# Patient Record
Sex: MALE | Race: OTHER | Employment: OTHER | URBAN - METROPOLITAN AREA
[De-identification: names, ages, dates, MRNs, and addresses within clinical notes are randomized per-mention and may not be internally consistent; named-entity substitution may affect disease eponyms.]

---

## 2019-06-11 ENCOUNTER — OFFICE VISIT (OUTPATIENT)
Dept: INTERNAL MEDICINE CLINIC | Age: 59
End: 2019-06-11

## 2019-06-11 VITALS
HEART RATE: 79 BPM | OXYGEN SATURATION: 96 % | RESPIRATION RATE: 16 BRPM | DIASTOLIC BLOOD PRESSURE: 84 MMHG | HEIGHT: 70 IN | WEIGHT: 249 LBS | BODY MASS INDEX: 35.65 KG/M2 | SYSTOLIC BLOOD PRESSURE: 123 MMHG | TEMPERATURE: 98.5 F

## 2019-06-11 DIAGNOSIS — Z13.1 SCREENING FOR DIABETES MELLITUS: ICD-10-CM

## 2019-06-11 DIAGNOSIS — E55.9 VITAMIN D DEFICIENCY: ICD-10-CM

## 2019-06-11 DIAGNOSIS — I10 ESSENTIAL HYPERTENSION: ICD-10-CM

## 2019-06-11 DIAGNOSIS — E78.2 MIXED HYPERLIPIDEMIA: ICD-10-CM

## 2019-06-11 DIAGNOSIS — Z12.5 SCREENING FOR MALIGNANT NEOPLASM OF PROSTATE: ICD-10-CM

## 2019-06-11 DIAGNOSIS — Z11.59 NEED FOR HEPATITIS C SCREENING TEST: ICD-10-CM

## 2019-06-11 DIAGNOSIS — E66.01 SEVERE OBESITY (HCC): ICD-10-CM

## 2019-06-11 DIAGNOSIS — R53.83 FATIGUE, UNSPECIFIED TYPE: ICD-10-CM

## 2019-06-11 DIAGNOSIS — Z00.00 PHYSICAL EXAM: Primary | ICD-10-CM

## 2019-06-11 DIAGNOSIS — Z12.11 SCREENING FOR COLON CANCER: ICD-10-CM

## 2019-06-11 LAB
25(OH)D3 SERPL-MCNC: 30 NG/ML (ref 30–96)
A-G RATIO,AGRAT: 1.7 RATIO
ALBUMIN SERPL-MCNC: 4.8 G/DL (ref 3.9–5.4)
ALP SERPL-CCNC: 83 U/L (ref 38–126)
ALT SERPL-CCNC: 22 U/L (ref 9–52)
ANION GAP SERPL CALC-SCNC: 10 MMOL/L
AST SERPL W P-5'-P-CCNC: 26 U/L (ref 14–36)
BILIRUB SERPL-MCNC: 0.6 MG/DL (ref 0.2–1.3)
BILIRUB UR QL: NEGATIVE
BUN SERPL-MCNC: 15 MG/DL (ref 9–20)
BUN/CREATININE RATIO,BUCR: 19 RATIO
CALCIUM SERPL-MCNC: 10 MG/DL (ref 8.4–10.2)
CHLORIDE SERPL-SCNC: 101 MMOL/L (ref 98–107)
CHOL/HDL RATIO,CHHD: 5 RATIO (ref 0–4)
CHOLEST SERPL-MCNC: 174 MG/DL (ref 0–200)
CLARITY: CLEAR
CO2 SERPL-SCNC: 32 MMOL/L (ref 22–32)
COLOR UR: ABNORMAL
CREAT SERPL-MCNC: 0.8 MG/DL (ref 0.8–1.5)
ERYTHROCYTE [DISTWIDTH] IN BLOOD BY AUTOMATED COUNT: 14.9 %
GLOBULIN,GLOB: 2.9
GLUCOSE 24H UR-MRATE: NEGATIVE G/(24.H)
GLUCOSE SERPL-MCNC: 105 MG/DL (ref 75–110)
HCT VFR BLD AUTO: 46.9 % (ref 37–51)
HDLC SERPL-MCNC: 36 MG/DL (ref 35–130)
HGB BLD-MCNC: 15.8 G/DL (ref 12–18)
HGB UR QL STRIP: NEGATIVE
KETONES UR QL STRIP.AUTO: NEGATIVE
LDL/HDL RATIO,LDHD: 3 RATIO
LDLC SERPL CALC-MCNC: 118 MG/DL (ref 0–130)
LEUKOCYTE ESTERASE: NEGATIVE
LYMPHOCYTES ABSOLUTE: 3 K/UL (ref 0.6–4.1)
LYMPHOCYTES NFR BLD: 32.8 % (ref 10–58.5)
MCH RBC QN AUTO: 29.7 PG (ref 26–32)
MCHC RBC AUTO-ENTMCNC: 33.7 G/DL (ref 30–36)
MCV RBC AUTO: 88.2 FL (ref 80–97)
MONOCYTES ABS-DIF,2141: 0.7 K/UL (ref 0–1.8)
MONOCYTES NFR BLD: 7.1 % (ref 0.1–24)
NEUTROPHILS # BLD: 60.1 % (ref 37–92)
NEUTROPHILS ABS,2156: 5.5 K/UL (ref 2–7.8)
NITRITE UR QL STRIP.AUTO: NEGATIVE
PH UR STRIP: 6 [PH] (ref 5–7)
PLATELET # BLD AUTO: 293 K/UL (ref 140–440)
PMV BLD AUTO: 7.7 FL
POTASSIUM SERPL-SCNC: 3.7 MMOL/L (ref 3.6–5)
PROT SERPL-MCNC: 7.7 G/DL (ref 6.3–8.2)
PROT UR STRIP-MCNC: ABNORMAL MG/DL
PSA, TEST22: 0.6 NG/ML (ref 0–4)
RBC # BLD AUTO: 5.32 M/UL (ref 4.2–6.3)
RBC #/AREA URNS HPF: 0 #/HPF
SODIUM SERPL-SCNC: 143 MMOL/L (ref 137–145)
SP GR UR REFRACTOMETRY: 1.02 (ref 1–1.03)
T4 FREE SERPL-MCNC: 0.82 NG/DL (ref 0.58–2.3)
TRIGL SERPL-MCNC: 101 MG/DL (ref 0–200)
TSH SERPL DL<=0.05 MIU/L-ACNC: 1.68 UIU/ML (ref 0.34–5.6)
UROBILINOGEN UR QL STRIP.AUTO: NEGATIVE
VLDLC SERPL CALC-MCNC: 20 MG/DL
WBC # BLD AUTO: 9.2 K/UL (ref 4.1–10.9)
WBC URNS QL MICRO: 0 #/HPF

## 2019-06-11 RX ORDER — LOSARTAN POTASSIUM AND HYDROCHLOROTHIAZIDE 25; 100 MG/1; MG/1
1 TABLET ORAL DAILY
COMMUNITY
End: 2019-06-11 | Stop reason: SDUPTHER

## 2019-06-11 RX ORDER — AMLODIPINE BESYLATE 10 MG/1
10 TABLET ORAL DAILY
Qty: 90 TAB | Refills: 3 | Status: SHIPPED | OUTPATIENT
Start: 2019-06-11 | End: 2020-08-31 | Stop reason: SDUPTHER

## 2019-06-11 RX ORDER — LOSARTAN POTASSIUM AND HYDROCHLOROTHIAZIDE 25; 100 MG/1; MG/1
1 TABLET ORAL DAILY
Qty: 90 TAB | Refills: 3 | Status: SHIPPED | OUTPATIENT
Start: 2019-06-11 | End: 2020-08-31 | Stop reason: SDUPTHER

## 2019-06-11 NOTE — PROGRESS NOTES
Subjective:  Mr. Shaq Santos is a pleasant 66year old gentleman who is transitioning his care from Dr. Chen Hay, since he no longer takes his insurance. He is here to establish as a new patient. He has no complaints. Past Medical History:   Diagnosis Date    Hypercholesterolemia     Hypertension, benign      Past Surgical History:   Procedure Laterality Date    HX TONSILLECTOMY      HX WISDOM TEETH EXTRACTION         Current Outpatient Medications on File Prior to Visit   Medication Sig Dispense Refill    omega-3 fatty acids-vitamin e (FISH OIL) 1,000 mg Cap Take  by mouth two (2) times a day. No current facility-administered medications on file prior to visit. Allergies   Allergen Reactions    Erythromycin Nausea and Vomiting       Past Medical History/Surgeries:    1. Tonsillectomy. Illnesses:  1. Hypertension for the past 10 years. Family History:  Father  at age 46 of complications of lymphoma. Mother  age 52 of a heart attack. Two siblings are living, both his brother and sister are diabetic. Social History:  He is a . His wife passed away ten weeks ago secondary to complications of COPD. He has two daughters living and well. He does power washing. Allergies:  Erythromycin, which causes nausea. Medications:  1. Amlodipine 10 mg daily. 2. Losartan/Hydrochlorothiazide 100/25 mg daily. 3. Omega 3 fatty acid 1,000 mg twice daily. Habits:  Nonsmoker and non drinker. Review of Systems:  HEENT:  Denies any headaches, dizziness or blurred vision. He does wear reading glasses. CVR:  Denies any chest pain or palpitations. Denies any shortness of breath, cough, wheezing, PND or orthopnea. Denies any ankle edema. GI:  Appetite is good, weight is stable. Does have a normal bowel pattern without presence of blood in stools or melena. He is in need of getting a colonoscopy. :  Denies any urinary symptoms.      Physical Examination:  GENERAL:  Sammie gentleman in no acute distress. He is alert and oriented. He answers my questions appropriately. VITALS:  BP: 123/84. P: 79.  R: 16.  T: 98.5. O2 sat: 96.  WT: 249 lbs. HT: 5'10\". HEENT:  Normocephalic, atraumatic. PERRLA, EOMI. TMs normal.  Mouth mucosa pink. Tongue midline. Pharynx normal.  NECK:  Supple without adenopathy, thyromegaly or carotid bruits. CHEST:  Lungs clear to auscultation, no rales or wheezes. CV:  Heart regular rhythm without murmur or gallop. ABDOMEN:  Soft, non tender, no organomegaly or masses. No lymphadenopathy. EXTREMITIES:  No edema or calf tenderness. Distal pulses were present. NEUROLOGIC:  Cranial nerves II-XII intact. Excellent strength in the upper and lower extremities against resistance. Sensation is preserved. Romberg is negative. Reflexes 1+ and symmetrical.  He had excellent coordination. Impression:  1. Normal PE.  2. Hypertension, stable. Plan:  1. He was fasting this morning so therefore it was opted to get all of his lab studies. I will call him as soon as I have his results. Otherwise I will see him yearly. 2. He declined his Shingrix immunization. 3. He will be scheduled for his colonoscopy at a later date as per his request.  4. We discussed the importance of a prudent diet, exercise and weight management to keep BMI within acceptable level.

## 2019-06-11 NOTE — PATIENT INSTRUCTIONS
High Blood Pressure: Care Instructions Overview It's normal for blood pressure to go up and down throughout the day. But if it stays up, you have high blood pressure. Another name for high blood pressure is hypertension. Despite what a lot of people think, high blood pressure usually doesn't cause headaches or make you feel dizzy or lightheaded. It usually has no symptoms. But it does increase your risk of stroke, heart attack, and other problems. You and your doctor will talk about your risks of these problems based on your blood pressure. Your doctor will give you a goal for your blood pressure. Your goal will be based on your health and your age. Lifestyle changes, such as eating healthy and being active, are always important to help lower blood pressure. You might also take medicine to reach your blood pressure goal. 
Follow-up care is a key part of your treatment and safety. Be sure to make and go to all appointments, and call your doctor if you are having problems. It's also a good idea to know your test results and keep a list of the medicines you take. How can you care for yourself at home? Medical treatment · If you stop taking your medicine, your blood pressure will go back up. You may take one or more types of medicine to lower your blood pressure. Be safe with medicines. Take your medicine exactly as prescribed. Call your doctor if you think you are having a problem with your medicine. · Talk to your doctor before you start taking aspirin every day. Aspirin can help certain people lower their risk of a heart attack or stroke. But taking aspirin isn't right for everyone, because it can cause serious bleeding. · See your doctor regularly. You may need to see the doctor more often at first or until your blood pressure comes down. · If you are taking blood pressure medicine, talk to your doctor before you take decongestants or anti-inflammatory medicine, such as ibuprofen. Some of these medicines can raise blood pressure. · Learn how to check your blood pressure at home. Lifestyle changes · Stay at a healthy weight. This is especially important if you put on weight around the waist. Losing even 10 pounds can help you lower your blood pressure. · If your doctor recommends it, get more exercise. Walking is a good choice. Bit by bit, increase the amount you walk every day. Try for at least 30 minutes on most days of the week. You also may want to swim, bike, or do other activities. · Avoid or limit alcohol. Talk to your doctor about whether you can drink any alcohol. · Try to limit how much sodium you eat to less than 2,300 milligrams (mg) a day. Your doctor may ask you to try to eat less than 1,500 mg a day. · Eat plenty of fruits (such as bananas and oranges), vegetables, legumes, whole grains, and low-fat dairy products. · Lower the amount of saturated fat in your diet. Saturated fat is found in animal products such as milk, cheese, and meat. Limiting these foods may help you lose weight and also lower your risk for heart disease. · Do not smoke. Smoking increases your risk for heart attack and stroke. If you need help quitting, talk to your doctor about stop-smoking programs and medicines. These can increase your chances of quitting for good. When should you call for help? Call 911 anytime you think you may need emergency care. This may mean having symptoms that suggest that your blood pressure is causing a serious heart or blood vessel problem. Your blood pressure may be over 180/120. 
 For example, call 911 if: 
  · You have symptoms of a heart attack. These may include: 
? Chest pain or pressure, or a strange feeling in the chest. 
? Sweating. ? Shortness of breath. ? Nausea or vomiting. ? Pain, pressure, or a strange feeling in the back, neck, jaw, or upper belly or in one or both shoulders or arms. ? Lightheadedness or sudden weakness. ? A fast or irregular heartbeat.  
  · You have symptoms of a stroke. These may include: 
? Sudden numbness, tingling, weakness, or loss of movement in your face, arm, or leg, especially on only one side of your body. ? Sudden vision changes. ? Sudden trouble speaking. ? Sudden confusion or trouble understanding simple statements. ? Sudden problems with walking or balance. ? A sudden, severe headache that is different from past headaches.  
  · You have severe back or belly pain.  
 Do not wait until your blood pressure comes down on its own. Get help right away. 
 Call your doctor now or seek immediate care if: 
  · Your blood pressure is much higher than normal (such as 180/120 or higher), but you don't have symptoms.  
  · You think high blood pressure is causing symptoms, such as: 
? Severe headache. 
? Blurry vision.  
 Watch closely for changes in your health, and be sure to contact your doctor if: 
  · Your blood pressure measures higher than your doctor recommends at least 2 times. That means the top number is higher or the bottom number is higher, or both.  
  · You think you may be having side effects from your blood pressure medicine. Where can you learn more? Go to http://anupam-jim.info/. Enter J288 in the search box to learn more about \"High Blood Pressure: Care Instructions. \" Current as of: July 22, 2018 Content Version: 11.9 © 2371-7699 [employer]. Care instructions adapted under license by [email or web address] (which disclaims liability or warranty for this information). If you have questions about a medical condition or this instruction, always ask your healthcare professional. [place] [address] any warranty or liability for your use of this information.

## 2019-06-11 NOTE — PROGRESS NOTES
Singh Mulligan  Identified pt with two pt identifiers(name and ). Chief Complaint   Patient presents with   [address]   Transferring care from Dr Bernadette Clifford. He is fasting     1. Have you been to the ER, urgent care clinic since your last visit? Hospitalized since your last visit? no    2. Have you seen or consulted any other health care providers outside of the [address] [place] [address] since your last visit? Include any pap smears or colon screening. no      Medication reconciliation up to date and corrected with patient at this time. Today's provider has been notified of reason for visit, vitals and flowsheets obtained on patients. Reviewed record in preparation for visit, huddled with provider and have obtained necessary documentation. Depression Risk Factor Screening:     3 most recent PHQ Screens 2019   Little interest or pleasure in doing things Not at all   Feeling down, depressed, irritable, or hopeless Several days   Total Score PHQ 2 1       Functional Ability and Level of Safety:     Activities of Daily Living  No flowsheet data found. Fall Risk  No flowsheet data found. Abuse Screen  No flowsheet data found.         Health Maintenance Due   Topic    Hepatitis C Screening     DTaP/Tdap/Td series (1 - Tdap)    Shingrix Vaccine Age 50> (1 of 2)    FOBT Q 1 YEAR AGE 50-75        Wt Readings from Last 3 Encounters:   19 249 lb (112.9 kg)   11/17/10 239 lb 12.8 oz (108.8 kg)     Temp Readings from Last 3 Encounters:   19 98.5 °F (36.9 °C) (Oral)     BP Readings from Last 3 Encounters:   19 123/84   11/17/10 160/88     Pulse Readings from Last 3 Encounters:   19 79   11/17/10 71     Vitals:    19 1001   BP: 123/84   Pulse: 79   Resp: 16   Temp: 98.5 °F (36.9 °C)   TempSrc: Oral   SpO2: 96%   Weight: 249 lb (112.9 kg)   Height: 5' 10\" (1.778 m)   PainSc:   0 - No pain         Patient Care Team   Patient Care Team:  Pablo Watkins NP as PCP - General (Nurse Practitioner)

## 2019-06-12 LAB
EST. AVERAGE GLUCOSE BLD GHB EST-MCNC: 117 MG/DL
HBA1C MFR BLD: 5.7 % (ref 4.8–5.6)
HCV AB S/CO SERPL IA: <0.1 S/CO RATIO (ref 0–0.9)

## 2019-07-01 ENCOUNTER — TELEPHONE (OUTPATIENT)
Dept: INTERNAL MEDICINE CLINIC | Age: 59
End: 2019-07-01

## 2019-07-08 ENCOUNTER — OFFICE VISIT (OUTPATIENT)
Dept: INTERNAL MEDICINE CLINIC | Age: 59
End: 2019-07-08

## 2019-07-08 VITALS
BODY MASS INDEX: 33.64 KG/M2 | WEIGHT: 235 LBS | HEART RATE: 97 BPM | RESPIRATION RATE: 16 BRPM | HEIGHT: 70 IN | SYSTOLIC BLOOD PRESSURE: 138 MMHG | DIASTOLIC BLOOD PRESSURE: 84 MMHG | TEMPERATURE: 98.3 F | OXYGEN SATURATION: 96 %

## 2019-07-08 DIAGNOSIS — N52.9 ERECTILE DYSFUNCTION, UNSPECIFIED ERECTILE DYSFUNCTION TYPE: Primary | ICD-10-CM

## 2019-07-08 RX ORDER — BISMUTH SUBSALICYLATE 262 MG
1 TABLET,CHEWABLE ORAL DAILY
COMMUNITY

## 2019-07-08 NOTE — PATIENT INSTRUCTIONS
Erectile Dysfunction: Care Instructions  Your Care Instructions    A man has erectile dysfunction (ED) when he routinely can't get or keep an erection that allows satisfactory sex. He may not be able to have an erection at any time. Or he may not be able to have one that is firm enough or lasts long enough to complete intercourse. ED is not the same as having trouble getting an erection now and then. That's common. It happens to most men at some time. ED can be caused by problems with the blood vessels, nerves, or hormones. It can be caused by diabetes, heart disease, and injuries. Nerve disorders, such as multiple sclerosis or Parkinson's disease, can also cause it. ED can also be caused by medicines, alcohol, and tobacco. Or it may be caused by depression, stress, grief, or relationship problems. Follow-up care is a key part of your treatment and safety. Be sure to make and go to all appointments, and call your doctor if you are having problems. It's also a good idea to know your test results and keep a list of the medicines you take. How can you care for yourself at home?   Lifestyle    · Limit alcohol. Have no more than 2 drinks a day.     · Do not smoke. Smoking makes it harder for the blood vessels in the penis to relax and let blood flow in. If you need help quitting, talk to your doctor about stop-smoking programs and medicines. These can increase your chances of quitting for good.     · Do not use cocaine, heroin, or other illegal drugs.     · Try to reduce stress.     · Give yourself time to adjust to change. Changes in your job, family, relationships, home life, and other areas can cause stress. And stress can cause erection problems.    Work with your partner    · Don't assume that you know what your partner likes when it comes to sex. You may be wrong. Talk about what each of you does and does not enjoy.     · Make time outside of the bedroom to talk about your sex life.  If you avoid sex because you are afraid of having erection problems, your partner may worry that you are no longer interested.     · If you and your partner have trouble talking about sex, see a therapist who can help you talk about it. Reading books with your partner about sexual health may also help.     · Relax. Take time for more foreplay. Worrying about your erections may only make things worse. Medicines    · Tell your doctor about all the medicines that you take. ? Some medicines can cause erection problems. ? Some medicines can have dangerous interactions with medicines that are prescribed for ED, including over-the-counter medicines and herbal products.     · Be safe with medicines. Take your medicines exactly as prescribed. Call your doctor if you think you are having a problem with your medicine.     · Talk to your doctor about trying a medicine to help you keep an erection. This could be a medicine such as Viagra, Levitra, or Cialis. If you have a heart problem, ask your doctor if these are safe for you. Do not take these medicines if you take nitroglycerin or other nitrate medicine. When should you call for help? Call your doctor now or seek immediate medical care if:    · You took a medicine for erectile dysfunction and you have an erection that lasts longer than 3 hours.    Watch closely for changes in your health, and be sure to contact your doctor if you have any problems. Where can you learn more? Go to http://anupam-jim.info/. Enter [id number] [id number] in the search box to learn more about \"Erectile Dysfunction: Care Instructions. \"  Current as of: September 26, 2018  Content Version: 11.9  © 1970-0423 [employer]. Care instructions adapted under license by [employer] (which disclaims liability or warranty for this information).  If you have questions about a medical condition or this instruction, always ask your healthcare professional. [address] any warranty or liability for your use of this information.

## 2019-07-08 NOTE — PROGRESS NOTES
Subjective:  Mr. April Valdez is a pleasant 66year old gentleman who comes in to discuss ED. He would like a referral to urologist.  Further discussion with him revealed that he really has not been sexually active in the last five to six years since his wife was ill. His wife passed away five months ago. He has now met a new partner and tells me that he was unable to perform. This has gotten him somewhat upset. Past Medical History:   Diagnosis Date    Hypercholesterolemia     Hypertension, benign      Past Surgical History:   Procedure Laterality Date    HX TONSILLECTOMY      HX WISDOM TEETH EXTRACTION         Current Outpatient Medications on File Prior to Visit   Medication Sig Dispense Refill    multivitamin (ONE A DAY) tablet Take 1 Tab by mouth daily.  losartan-hydroCHLOROthiazide (HYZAAR) 100-25 mg per tablet Take 1 Tab by mouth daily. 90 Tab 3    amLODIPine (NORVASC) 10 mg tablet Take 1 Tab by mouth daily. 90 Tab 3    omega-3 fatty acids-vitamin e (FISH OIL) 1,000 mg Cap Take  by mouth two (2) times a day. No current facility-administered medications on file prior to visit. Allergies   Allergen Reactions    Erythromycin Nausea and Vomiting   Physical Examination:  GENERAL:  Pleasant gentleman in no acute distress. He is alert and oriented. VITALS:  BP: 143/91 initially, repeated by myself 138/84. P: 97.  R: 16.  T: 98.3. O2 sat: 96.  WT: 235 lbs, which is down by 14 lbs since I saw him in June. He has done this intentionally. HEENT:  Normocephalic, atraumatic. NECK:  Supple without adenopathy. CHEST:  Lungs clear to auscultation, no rales or wheezes. CV:  Heart regular rhythm without murmur. EXTREMITIES:  No edema or calf tenderness. Distal pulses were present. Impression:  1. ED. Plan:  1.  [facility].

## 2019-07-08 NOTE — PROGRESS NOTES
Columba Sewell  Identified pt with two pt identifiers(name and ). Chief Complaint   Patient presents with    Erectile Dysfunction       1. Have you been to the ER, urgent care clinic since your last visit? Hospitalized since your last visit? no    2. Have you seen or consulted any other health care providers outside of the [address] [place] [address] since your last visit? Include any pap smears or colon screening. no      Medication reconciliation up to date and corrected with patient at this time. Today's provider has been notified of reason for visit, vitals and flowsheets obtained on patients. Reviewed record in preparation for visit, huddled with provider and have obtained necessary documentation. Discussed Advanced Care Directives. No information on file. Information given for patient to review. Depression Risk Factor Screening:     3 most recent PHQ Screens 2019   Little interest or pleasure in doing things Not at all   Feeling down, depressed, irritable, or hopeless Several days   Total Score PHQ 2 1       Functional Ability and Level of Safety:     Activities of Daily Living  No flowsheet data found. Fall Risk  No flowsheet data found. Abuse Screen  No flowsheet data found.         Health Maintenance Due   Topic    DTaP/Tdap/Td series (1 - Tdap)    FOBT Q 1 YEAR AGE 50-75        Wt Readings from Last 3 Encounters:   19 235 lb (106.6 kg)   19 249 lb (112.9 kg)   11/17/10 239 lb 12.8 oz (108.8 kg)     Temp Readings from Last 3 Encounters:   19 98.3 °F (36.8 °C) (Oral)   19 98.5 °F (36.9 °C) (Oral)     BP Readings from Last 3 Encounters:   19 (!) 143/91   19 123/84   11/17/10 160/88     Pulse Readings from Last 3 Encounters:   19 97   19 79   11/17/10 71     Vitals:    19 0924   BP: (!) 143/91   Pulse: 97   Resp: 16   Temp: 98.3 °F (36.8 °C)   TempSrc: Oral   SpO2: 96%   Weight: 235 lb (106.6 kg)   Height: 5' 10\" (1.778 m) PainSc:   2         Patient Care Team   Patient Care Team:  Jodee Cho NP as PCP - General (Nurse Practitioner)

## 2019-07-25 ENCOUNTER — APPOINTMENT (OUTPATIENT)
Dept: GENERAL RADIOLOGY | Age: 59
End: 2019-07-25
Attending: EMERGENCY MEDICINE
Payer: MEDICAID

## 2019-07-25 ENCOUNTER — HOSPITAL ENCOUNTER (EMERGENCY)
Age: 59
Discharge: HOME | End: 2019-07-25
Attending: EMERGENCY MEDICINE | Admitting: EMERGENCY MEDICINE
Payer: MEDICAID

## 2019-07-25 VITALS
BODY MASS INDEX: 32.76 KG/M2 | OXYGEN SATURATION: 97 % | HEART RATE: 88 BPM | SYSTOLIC BLOOD PRESSURE: 156 MMHG | HEIGHT: 70 IN | WEIGHT: 228.84 LBS | RESPIRATION RATE: 18 BRPM | DIASTOLIC BLOOD PRESSURE: 85 MMHG | TEMPERATURE: 98.6 F

## 2019-07-25 DIAGNOSIS — R10.84 GENERALIZED ABDOMINAL PAIN: Primary | ICD-10-CM

## 2019-07-25 DIAGNOSIS — E87.6 HYPOKALEMIA: ICD-10-CM

## 2019-07-25 DIAGNOSIS — R14.0 ABDOMINAL BLOATING: ICD-10-CM

## 2019-07-25 LAB
ALBUMIN SERPL-MCNC: 4.4 G/DL (ref 3.5–5)
ALBUMIN/GLOB SERPL: 1.2 {RATIO} (ref 1.1–2.2)
ALP SERPL-CCNC: 69 U/L (ref 45–117)
ALT SERPL-CCNC: 31 U/L (ref 12–78)
ANION GAP SERPL CALC-SCNC: 7 MMOL/L (ref 5–15)
APPEARANCE UR: CLEAR
AST SERPL-CCNC: 22 U/L (ref 15–37)
BACTERIA URNS QL MICRO: NEGATIVE /HPF
BASOPHILS # BLD: 0 K/UL (ref 0–0.1)
BASOPHILS NFR BLD: 0 % (ref 0–1)
BILIRUB SERPL-MCNC: 0.6 MG/DL (ref 0.2–1)
BILIRUB UR QL: NEGATIVE
BUN SERPL-MCNC: 16 MG/DL (ref 6–20)
BUN/CREAT SERPL: 12 (ref 12–20)
CALCIUM SERPL-MCNC: 10.1 MG/DL (ref 8.5–10.1)
CHLORIDE SERPL-SCNC: 102 MMOL/L (ref 97–108)
CO2 SERPL-SCNC: 33 MMOL/L (ref 21–32)
COLOR UR: NORMAL
CREAT SERPL-MCNC: 1.33 MG/DL (ref 0.7–1.3)
DIFFERENTIAL METHOD BLD: NORMAL
EOSINOPHIL # BLD: 0.1 K/UL (ref 0–0.4)
EOSINOPHIL NFR BLD: 1 % (ref 0–7)
EPITH CASTS URNS QL MICRO: NORMAL /LPF
ERYTHROCYTE [DISTWIDTH] IN BLOOD BY AUTOMATED COUNT: 13.4 % (ref 11.5–14.5)
GLOBULIN SER CALC-MCNC: 3.7 G/DL (ref 2–4)
GLUCOSE SERPL-MCNC: 100 MG/DL (ref 65–100)
GLUCOSE UR STRIP.AUTO-MCNC: NEGATIVE MG/DL
HCT VFR BLD AUTO: 41.9 % (ref 36.6–50.3)
HGB BLD-MCNC: 14.5 G/DL (ref 12.1–17)
HGB UR QL STRIP: NEGATIVE
HYALINE CASTS URNS QL MICRO: NORMAL /LPF (ref 0–5)
IMM GRANULOCYTES # BLD AUTO: 0 K/UL (ref 0–0.04)
IMM GRANULOCYTES NFR BLD AUTO: 0 % (ref 0–0.5)
KETONES UR QL STRIP.AUTO: NEGATIVE MG/DL
LEUKOCYTE ESTERASE UR QL STRIP.AUTO: NEGATIVE
LIPASE SERPL-CCNC: 204 U/L (ref 73–393)
LYMPHOCYTES # BLD: 3.4 K/UL (ref 0.8–3.5)
LYMPHOCYTES NFR BLD: 36 % (ref 12–49)
MCH RBC QN AUTO: 29.4 PG (ref 26–34)
MCHC RBC AUTO-ENTMCNC: 34.6 G/DL (ref 30–36.5)
MCV RBC AUTO: 84.8 FL (ref 80–99)
MONOCYTES # BLD: 0.8 K/UL (ref 0–1)
MONOCYTES NFR BLD: 9 % (ref 5–13)
NEUTS SEG # BLD: 5 K/UL (ref 1.8–8)
NEUTS SEG NFR BLD: 54 % (ref 32–75)
NITRITE UR QL STRIP.AUTO: NEGATIVE
NRBC # BLD: 0 K/UL (ref 0–0.01)
NRBC BLD-RTO: 0 PER 100 WBC
PH UR STRIP: 5.5 [PH] (ref 5–8)
PLATELET # BLD AUTO: 259 K/UL (ref 150–400)
PMV BLD AUTO: 9.5 FL (ref 8.9–12.9)
POTASSIUM SERPL-SCNC: 3 MMOL/L (ref 3.5–5.1)
PROT SERPL-MCNC: 8.1 G/DL (ref 6.4–8.2)
PROT UR STRIP-MCNC: NEGATIVE MG/DL
RBC # BLD AUTO: 4.94 M/UL (ref 4.1–5.7)
RBC #/AREA URNS HPF: NORMAL /HPF (ref 0–5)
SODIUM SERPL-SCNC: 142 MMOL/L (ref 136–145)
SP GR UR REFRACTOMETRY: 1.02 (ref 1–1.03)
UA: UC IF INDICATED,UAUC: NORMAL
UROBILINOGEN UR QL STRIP.AUTO: 0.2 EU/DL (ref 0.2–1)
WBC # BLD AUTO: 9.3 K/UL (ref 4.1–11.1)
WBC URNS QL MICRO: NORMAL /HPF (ref 0–4)

## 2019-07-25 PROCEDURE — 83690 ASSAY OF LIPASE: CPT

## 2019-07-25 PROCEDURE — 74011250637 HC RX REV CODE- 250/637: Performed by: PHYSICIAN ASSISTANT

## 2019-07-25 PROCEDURE — 36415 COLL VENOUS BLD VENIPUNCTURE: CPT

## 2019-07-25 PROCEDURE — 71046 X-RAY EXAM CHEST 2 VIEWS: CPT

## 2019-07-25 PROCEDURE — 80053 COMPREHEN METABOLIC PANEL: CPT

## 2019-07-25 PROCEDURE — 81001 URINALYSIS AUTO W/SCOPE: CPT

## 2019-07-25 PROCEDURE — 99283 EMERGENCY DEPT VISIT LOW MDM: CPT

## 2019-07-25 PROCEDURE — 85025 COMPLETE CBC W/AUTO DIFF WBC: CPT

## 2019-07-25 RX ORDER — POTASSIUM CHLORIDE 750 MG/1
40 TABLET, FILM COATED, EXTENDED RELEASE ORAL
Status: COMPLETED | OUTPATIENT
Start: 2019-07-25 | End: 2019-07-25

## 2019-07-25 RX ORDER — POTASSIUM CHLORIDE 1500 MG/1
20 TABLET, FILM COATED, EXTENDED RELEASE ORAL DAILY
Qty: 10 TAB | Refills: 0 | Status: SHIPPED | OUTPATIENT
Start: 2019-07-25 | End: 2019-08-04

## 2019-07-25 RX ADMIN — POTASSIUM CHLORIDE 40 MEQ: 750 TABLET, EXTENDED RELEASE ORAL at 18:11

## 2019-07-25 NOTE — LETTER
[address] 
[facility] EMERGENCY DEPT 
[address] [facility] [place] [address] 
[phone] Work/School Note Date: 7/25/2019 To Whom It May concern: 
 
Laquita Golden was seen and treated today in the emergency room. He may return to work in 1 to 2 days, as symptoms improve. Sincerely, Mike Cabrera

## 2019-07-25 NOTE — ED PROVIDER NOTES
EMERGENCY DEPARTMENT HISTORY AND PHYSICAL EXAM      Date: 7/25/2019  Patient Name: Laquita Golden    History of Presenting Illness     Chief Complaint   Patient presents with    Abdominal Pressure     c/o intermittent abdomional bloating x one month following GI illness one month ago; denies n/v/d. Patient states most commonly associated when eating food    Rib Pain     c/o right sided rib pain x two weeks secondary to a fall while powerwashing       History Provided By: Patient    HPI: Laquita Golden, 66 y.o. male presents ambulatory to the Emergency Dept with c/o 1 month h/o intermittent abdominal pain and bloating. He denied any pain at present. He denied nausea/vomiting. He states he was in [facility] 1 month ago when he began with abdominal discomfort and several days of loose stools and has noted increased bloating and intermittent abdominal discomfort since that time. He reports he is scheduled to follow up with [facility] in a few days for colonoscopy/further evaluation \"but I got looking things up on the Internet and got worried about my pancreas\". Pt denied N/V. No fever/chills. No dysuria/hematuria. He denied personal or family h/o chronic abdominal issues. He denied tobacco or alcohol use. He did report a GLF several weeks ago and has noted some pain along the R ribcage since that time. He denied chest pain or shortness of breath. Chief Complaint: abdominal pain, bloating  Duration: 4 Weeks  Timing:  Intermittent  Location: abdomen  Quality: Pressure  Severity: Moderate  Modifying Factors: no exacerbating/alleviating features, pain free at present  Associated Symptoms: mild R chest wall pain after a GLF recently        There are no other complaints, changes, or physical findings at this time.     PCP: Josephine Palomo NP    Current Outpatient Medications   Medication Sig Dispense Refill    potassium chloride SR (K-TAB) 20 mEq tablet Take 1 Tab by mouth daily for 10 days. 10 Tab 0    multivitamin (ONE A DAY) tablet Take 1 Tab by mouth daily.  losartan-hydroCHLOROthiazide (HYZAAR) 100-25 mg per tablet Take 1 Tab by mouth daily. 90 Tab 3    amLODIPine (NORVASC) 10 mg tablet Take 1 Tab by mouth daily. 90 Tab 3    omega-3 fatty acids-vitamin e (FISH OIL) 1,000 mg Cap Take  by mouth two (2) times a day. Past History     Past Medical History:  Past Medical History:   Diagnosis Date    Hypercholesterolemia     Hypertension, benign        Past Surgical History:  Past Surgical History:   Procedure Laterality Date    HX TONSILLECTOMY      HX WISDOM TEETH EXTRACTION         Family History:  Family History   Problem Relation Age of Onset    Heart Attack Mother     Hypertension Mother     Cancer Father         lymphoma       Social History:  Social History     Tobacco Use    Smoking status: Never Smoker    Smokeless tobacco: Never Used   Substance Use Topics    Alcohol use: Not Currently    Drug use: Not Currently       Allergies: Allergies   Allergen Reactions    Erythromycin Nausea and Vomiting         Review of Systems   Review of Systems   Constitutional: Negative for chills and fever. HENT: Negative for congestion, rhinorrhea and sore throat. Respiratory: Negative for cough and shortness of breath. Cardiovascular: Positive for chest pain. Negative for palpitations. Gastrointestinal: Positive for abdominal pain. Negative for diarrhea, nausea and vomiting. Endocrine: Negative for polydipsia, polyphagia and polyuria. Genitourinary: Negative for decreased urine volume, dysuria and hematuria. Musculoskeletal: Negative for neck pain and neck stiffness. Skin: Negative for rash and wound. Neurological: Negative for dizziness and headaches. Hematological: Negative for adenopathy. Does not bruise/bleed easily. Psychiatric/Behavioral: Negative for agitation and confusion.        Physical Exam   Physical Exam   Constitutional: He is oriented to person, place, and time. He appears well-developed and well-nourished. No distress. WDWN male, alert, in NAD   HENT:   Head: Normocephalic and atraumatic. Nose: Nose normal.   Mouth/Throat: Oropharynx is clear and moist. No oropharyngeal exudate. Eyes: Pupils are equal, round, and reactive to light. Conjunctivae and EOM are normal. Right eye exhibits no discharge. Left eye exhibits no discharge. No scleral icterus. Neck: Normal range of motion. Neck supple. No JVD present. No tracheal deviation present. No thyromegaly present. Cardiovascular: Normal rate, regular rhythm and normal heart sounds. Pulmonary/Chest: Effort normal and breath sounds normal. No respiratory distress. He has no wheezes. He exhibits tenderness. Mild R sided chest wall tenderness, no bruising/abrasion, no step off. Abdominal: Soft. He exhibits no distension. There is no tenderness. There is no rebound and no guarding. No CVAT   Musculoskeletal: Normal range of motion. He exhibits no edema or tenderness. No spinal tenderness, full A/P ROM throughout, 2+ distal pulses, NVI, sensation grossly intact to light touch. Lymphadenopathy:     He has no cervical adenopathy. Neurological: He is alert and oriented to person, place, and time. He exhibits normal muscle tone. Coordination normal.   Skin: Skin is warm and dry. No rash noted. He is not diaphoretic. No erythema. Psychiatric: He has a normal mood and affect. His behavior is normal. Judgment normal.   Nursing note and vitals reviewed.       Diagnostic Study Results     Labs -     Recent Results (from the past 12 hour(s))   URINALYSIS W/ REFLEX CULTURE    Collection Time: 07/25/19  2:47 PM   Result Value Ref Range    Color YELLOW/STRAW      Appearance CLEAR CLEAR      Specific gravity 1.021 1.003 - 1.030      pH (UA) 5.5 5.0 - 8.0      Protein NEGATIVE  NEG mg/dL    Glucose NEGATIVE  NEG mg/dL    Ketone NEGATIVE  NEG mg/dL    Bilirubin NEGATIVE  NEG Blood NEGATIVE  NEG      Urobilinogen 0.2 0.2 - 1.0 EU/dL    Nitrites NEGATIVE  NEG      Leukocyte Esterase NEGATIVE  NEG      WBC 0-4 0 - 4 /hpf    RBC 0-5 0 - 5 /hpf    Epithelial cells FEW FEW /lpf    Bacteria NEGATIVE  NEG /hpf    UA:UC IF INDICATED CULTURE NOT INDICATED BY UA RESULT CNI      Hyaline cast 2-5 0 - 5 /lpf   CBC WITH AUTOMATED DIFF    Collection Time: 07/25/19  4:03 PM   Result Value Ref Range    WBC 9.3 4.1 - 11.1 K/uL    RBC 4.94 4.10 - 5.70 M/uL    HGB 14.5 12.1 - 17.0 g/dL    HCT 41.9 36.6 - 50.3 %    MCV 84.8 80.0 - 99.0 FL    MCH 29.4 26.0 - 34.0 PG    MCHC 34.6 30.0 - 36.5 g/dL    RDW 13.4 11.5 - 14.5 %    PLATELET 397 437 - 246 K/uL    MPV 9.5 8.9 - 12.9 FL    NRBC 0.0 0  WBC    ABSOLUTE NRBC 0.00 0.00 - 0.01 K/uL    NEUTROPHILS 54 32 - 75 %    LYMPHOCYTES 36 12 - 49 %    MONOCYTES 9 5 - 13 %    EOSINOPHILS 1 0 - 7 %    BASOPHILS 0 0 - 1 %    IMMATURE GRANULOCYTES 0 0.0 - 0.5 %    ABS. NEUTROPHILS 5.0 1.8 - 8.0 K/UL    ABS. LYMPHOCYTES 3.4 0.8 - 3.5 K/UL    ABS. MONOCYTES 0.8 0.0 - 1.0 K/UL    ABS. EOSINOPHILS 0.1 0.0 - 0.4 K/UL    ABS. BASOPHILS 0.0 0.0 - 0.1 K/UL    ABS. IMM. GRANS. 0.0 0.00 - 0.04 K/UL    DF AUTOMATED     METABOLIC PANEL, COMPREHENSIVE    Collection Time: 07/25/19  4:03 PM   Result Value Ref Range    Sodium 142 136 - 145 mmol/L    Potassium 3.0 (L) 3.5 - 5.1 mmol/L    Chloride 102 97 - 108 mmol/L    CO2 33 (H) 21 - 32 mmol/L    Anion gap 7 5 - 15 mmol/L    Glucose 100 65 - 100 mg/dL    BUN 16 6 - 20 MG/DL    Creatinine 1.33 (H) 0.70 - 1.30 MG/DL    BUN/Creatinine ratio 12 12 - 20      GFR est AA >60 >60 ml/min/1.73m2    GFR est non-AA 56 (L) >60 ml/min/1.73m2    Calcium 10.1 8.5 - 10.1 MG/DL    Bilirubin, total 0.6 0.2 - 1.0 MG/DL    ALT (SGPT) 31 12 - 78 U/L    AST (SGOT) 22 15 - 37 U/L    Alk.  phosphatase 69 45 - 117 U/L    Protein, total 8.1 6.4 - 8.2 g/dL    Albumin 4.4 3.5 - 5.0 g/dL    Globulin 3.7 2.0 - 4.0 g/dL    A-G Ratio 1.2 1.1 - 2.2     LIPASE Collection Time: 07/25/19  4:03 PM   Result Value Ref Range    Lipase 204 73 - 393 U/L       Radiologic Studies -   XR CHEST PA LAT   Final Result   IMPRESSION: No acute cardiopulmonary disease. CXR Results  (Last 48 hours)               07/25/19 1601  XR CHEST PA LAT Final result    Impression:  IMPRESSION: No acute cardiopulmonary disease. Narrative: Indication:  Right sided thoracic pain. Exam: PA and lateral views of the chest.       There is no prior study for direct comparison. Findings: Cardiomediastinal silhouette is within normal limits. Lungs are clear   bilaterally. Pleural spaces are normal. Osseous structures are intact. Medical Decision Making   I am the first provider for this patient. I reviewed the vital signs, available nursing notes, past medical history, past surgical history, family history and social history. Vital Signs-Reviewed the patient's vital signs. Patient Vitals for the past 12 hrs:   Temp Pulse Resp BP SpO2   07/25/19 1815  88 18 156/85    07/25/19 1811  88 18 138/78    07/25/19 1412 98.6 °F (37 °C) 90 18 150/69 97 %         Records Reviewed: Nursing Notes, Old Medical Records, Previous Radiology Studies and Previous Laboratory Studies    Provider Notes (Medical Decision Making):   Gastritis, GERD, electrolyte dysfunction, PNA, rib fx    ED Course:   Initial assessment performed. The patients presenting problems have been discussed, and they are in agreement with the care plan formulated and outlined with them. I have encouraged them to ask questions as they arise throughout their visit. Offered imaging to assess bloating/discomfort; however, patient declined stating he is scheduled for follow up with Gastrointestinal specialist for possible colonoscopy on Monday. DISCHARGE NOTE:  The care plan has been outline with the patient and/or family, who verbally conveyed understanding and agreement.  Available results have been reviewed. Patient and/or family understand the follow up plan as outlined and discharge instructions. Should their condition deterioration at any time after discharge the patient agrees to return, follow up sooner than outlined or seek medical assistance at the closest Emergency Room as soon as possible. Questions have been answered. Discharge instructions and educational information regarding the patient's diagnosis as well a list of reasons why the patient would want to seek immediate medical attention, should their condition change, were reviewed directly with the patient/family       PLAN:  1. Discharge Medication List as of 7/25/2019  6:18 PM      START taking these medications    Details   potassium chloride SR (K-TAB) 20 mEq tablet Take 1 Tab by mouth daily for 10 days. , Print, Disp-10 Tab, R-0         CONTINUE these medications which have NOT CHANGED    Details   multivitamin (ONE A DAY) tablet Take 1 Tab by mouth daily. , Historical Med      losartan-hydroCHLOROthiazide (HYZAAR) 100-25 mg per tablet Take 1 Tab by mouth daily. , Normal, Disp-90 Tab, R-3      amLODIPine (NORVASC) 10 mg tablet Take 1 Tab by mouth daily. , NormalNeed appointment. Last seen 2010Disp-90 Tab, R-3      omega-3 fatty acids-vitamin e (FISH OIL) 1,000 mg Cap Oral, 2 TIMES DAILY Starting 11/12/2010, Until Discontinued, Historical Med           2. Follow-up Information     Follow up With Specialties Details Why Contact Info    Maile Rosa, ASHLEY Nurse Practitioner   [facility] [address] [place] [address]  [phone]      [facility] EMERGENCY DEPT Emergency Medicine  If symptoms worsen [address]  [address] [facility]  [phone]        Return to ED if worse     Diagnosis     Clinical Impression:   1. Generalized abdominal pain    2. Abdominal bloating    3.  Hypokalemia

## 2019-07-25 NOTE — DISCHARGE INSTRUCTIONS
Rest, push fluids, follow up with primary care for recheck. Follow up with Gastrointestinal specialist as scheduled. Return to the Emergency Dept for any worsening pain, nausea/vomiting, fever, decreased oral intake/urine output.

## 2019-09-11 ENCOUNTER — OFFICE VISIT (OUTPATIENT)
Dept: INTERNAL MEDICINE CLINIC | Age: 59
End: 2019-09-11

## 2019-09-11 VITALS
HEIGHT: 69 IN | SYSTOLIC BLOOD PRESSURE: 128 MMHG | OXYGEN SATURATION: 97 % | WEIGHT: 230 LBS | RESPIRATION RATE: 20 BRPM | TEMPERATURE: 99.8 F | DIASTOLIC BLOOD PRESSURE: 82 MMHG | HEART RATE: 89 BPM | BODY MASS INDEX: 34.07 KG/M2

## 2019-09-11 DIAGNOSIS — Z76.89 ENCOUNTER FOR ASSESSMENT OF STD EXPOSURE: Primary | ICD-10-CM

## 2019-09-11 NOTE — PATIENT INSTRUCTIONS
Body Mass Index: Care Instructions  Your Care Instructions    Body mass index (BMI) can help you see if your weight is raising your risk for health problems. It uses a formula to compare how much you weigh with how tall you are. · A BMI lower than 18.5 is considered underweight. · A BMI between 18.5 and 24.9 is considered healthy. · A BMI between 25 and 29.9 is considered overweight. A BMI of 30 or higher is considered obese. If your BMI is in the normal range, it means that you have a lower risk for weight-related health problems. If your BMI is in the overweight or obese range, you may be at increased risk for weight-related health problems, such as high blood pressure, heart disease, stroke, arthritis or joint pain, and diabetes. If your BMI is in the underweight range, you may be at increased risk for health problems such as fatigue, lower protection (immunity) against illness, muscle loss, bone loss, hair loss, and hormone problems. BMI is just one measure of your risk for weight-related health problems. You may be at higher risk for health problems if you are not active, you eat an unhealthy diet, or you drink too much alcohol or use tobacco products. Follow-up care is a key part of your treatment and safety. Be sure to make and go to all appointments, and call your doctor if you are having problems. It's also a good idea to know your test results and keep a list of the medicines you take. How can you care for yourself at home? · Practice healthy eating habits. This includes eating plenty of fruits, vegetables, whole grains, lean protein, and low-fat dairy. · If your doctor recommends it, get more exercise. Walking is a good choice. Bit by bit, increase the amount you walk every day. Try for at least 30 minutes on most days of the week. · Do not smoke. Smoking can increase your risk for health problems. If you need help quitting, talk to your doctor about stop-smoking programs and medicines. These can increase your chances of quitting for good. · Limit alcohol to 2 drinks a day for men and 1 drink a day for women. Too much alcohol can cause health problems. If you have a BMI higher than 25  · Your doctor may do other tests to check your risk for weight-related health problems. This may include measuring the distance around your waist. A waist measurement of more than 40 inches in men or 35 inches in women can increase the risk of weight-related health problems. · Talk with your doctor about steps you can take to stay healthy or improve your health. You may need to make lifestyle changes to lose weight and stay healthy, such as changing your diet and getting regular exercise. If you have a BMI lower than 18.5  · Your doctor may do other tests to check your risk for health problems. · Talk with your doctor about steps you can take to stay healthy or improve your health. You may need to make lifestyle changes to gain or maintain weight and stay healthy, such as getting more healthy foods in your diet and doing exercises to build muscle. Where can you learn more? Go to http://anupam-jim.info/. Enter S176 in the search box to learn more about \"Body Mass Index: Care Instructions. \"  Current as of: March 28, 2019  Content Version: 12.1  © 7345-0427 [employer]. Care instructions adapted under license by [employer] (which disclaims liability or warranty for this information). If you have questions about a medical condition or this instruction, always ask your healthcare professional. [address] any warranty or liability for your use of this information.

## 2019-09-11 NOTE — PROGRESS NOTES
Subjective:  Mr. Shelly Desai is a pleasant 64year old gentleman, who comes in today to be tested for STDs. He tells me he has met a new partner and their relationship has become more serious and she has requested for him to be tested. He has no symptoms. Past Medical History:   Diagnosis Date    Hypercholesterolemia     Hypertension, benign      Past Surgical History:   Procedure Laterality Date    HX TONSILLECTOMY      HX WISDOM TEETH EXTRACTION         Current Outpatient Medications on File Prior to Visit   Medication Sig Dispense Refill    OTHER Indications: Multivitamin with Testosterone Supplement - 1 tablet daily      losartan-hydroCHLOROthiazide (HYZAAR) 100-25 mg per tablet Take 1 Tab by mouth daily. 90 Tab 3    amLODIPine (NORVASC) 10 mg tablet Take 1 Tab by mouth daily. 90 Tab 3    multivitamin (ONE A DAY) tablet Take 1 Tab by mouth daily.  omega-3 fatty acids-vitamin e (FISH OIL) 1,000 mg Cap Take  by mouth two (2) times a day. No current facility-administered medications on file prior to visit. Allergies   Allergen Reactions    Erythromycin Nausea and Vomiting   Physical Examination:  GENERAL:  Pleasant gentleman in no acute distress. He is alert and oriented. Answers my questions appropriately. VITALS:  BP: 128/82. P: 89.  R: 20.  T: 99.8. O2 sat: 97%. HEENT:  Normocephalic, atraumatic. NECK:  Supple without adenopathy. CHEST:  Lungs clear to auscultation, no rales or wheezes. CV:  Heart regular rhythm without murmur. EXTREMITIES:  No edema or calf tenderness. Distal pulses were present. Impression:  1. Encounter for STDs. Plan:  1. We did all of his lab studies and I will call him as soon as I have his results, otherwise I will see him on a prn basis.

## 2019-09-11 NOTE — PROGRESS NOTES
Saul Bell  Identified pt with two pt identifiers(name and ). Chief Complaint   Patient presents with    Other     Wants to be tested for STDs       1. Have you been to the ER, urgent care clinic since your last visit? Hospitalized since your last visit? Yes, [facility], 6 -8 weeks ago, abdominal pain    2. Have you seen or consulted any other health care providers outside of the [address] [place] [address] since your last visit? Include any pap smears or colon screening. Yes, Urologist, [facility], [id number], for ? ED      Medication reconciliation up to date and corrected with patient at this time. Today's provider has been notified of reason for visit, vitals and flowsheets obtained on patients. Reviewed record in preparation for visit, huddled with provider and have obtained necessary documentation. Depression Risk Factor Screening:     3 most recent PHQ Screens 2019   Little interest or pleasure in doing things Not at all   Feeling down, depressed, irritable, or hopeless Several days   Total Score PHQ 2 1       Functional Ability and Level of Safety:     Activities of Daily Living  No flowsheet data found. Fall Risk  No flowsheet data found. Abuse Screen  No flowsheet data found.         Health Maintenance Due   Topic    DTaP/Tdap/Td series (1 - Tdap)    FOBT Q 1 YEAR AGE 54-65     Influenza Age 5 to Adult        Wt Readings from Last 3 Encounters:   19 230 lb (104.3 kg)   19 228 lb 13.4 oz (103.8 kg)   19 235 lb (106.6 kg)     Temp Readings from Last 3 Encounters:   19 99.8 °F (37.7 °C) (Oral)   19 98.6 °F (37 °C)   19 98.3 °F (36.8 °C) (Oral)     BP Readings from Last 3 Encounters:   19 128/82   19 156/85   19 138/84     Pulse Readings from Last 3 Encounters:   19 89   19 88   19 97     Vitals:    19 1518   BP: 128/82   Pulse: 89   Resp: 20   Temp: 99.8 °F (37.7 °C)   TempSrc: Oral   SpO2: 97%   Weight: 230 lb (104.3 kg)   Height: 5' 9.4\" (1.763 m)   PainSc:   0 - No pain         Patient Care Team   Patient Care Team:  Melissa Gayle NP as PCP - General (Nurse Practitioner)

## 2019-09-12 LAB
HIV 1+2 AB+HIV1 P24 AG SERPL QL IA: NON REACTIVE
RPR SER QL: NON REACTIVE

## 2019-09-13 LAB
C TRACH RRNA SPEC QL NAA+PROBE: NEGATIVE
N GONORRHOEA RRNA SPEC QL NAA+PROBE: NEGATIVE

## 2019-09-24 ENCOUNTER — ANESTHESIA (OUTPATIENT)
Dept: ENDOSCOPY | Age: 59
End: 2019-09-24
Payer: MEDICAID

## 2019-09-24 ENCOUNTER — ANESTHESIA EVENT (OUTPATIENT)
Dept: ENDOSCOPY | Age: 59
End: 2019-09-24
Payer: MEDICAID

## 2019-09-24 ENCOUNTER — HOSPITAL ENCOUNTER (OUTPATIENT)
Age: 59
Setting detail: OUTPATIENT SURGERY
Discharge: HOME | End: 2019-09-24
Attending: SPECIALIST | Admitting: SPECIALIST
Payer: MEDICAID

## 2019-09-24 VITALS
BODY MASS INDEX: 33.5 KG/M2 | OXYGEN SATURATION: 97 % | DIASTOLIC BLOOD PRESSURE: 66 MMHG | TEMPERATURE: 97.8 F | WEIGHT: 234 LBS | HEART RATE: 61 BPM | RESPIRATION RATE: 11 BRPM | SYSTOLIC BLOOD PRESSURE: 113 MMHG | HEIGHT: 70 IN

## 2019-09-24 PROCEDURE — 74011250637 HC RX REV CODE- 250/637: Performed by: SPECIALIST

## 2019-09-24 PROCEDURE — 74011000250 HC RX REV CODE- 250: Performed by: NURSE ANESTHETIST, CERTIFIED REGISTERED

## 2019-09-24 PROCEDURE — 74011250636 HC RX REV CODE- 250/636: Performed by: NURSE ANESTHETIST, CERTIFIED REGISTERED

## 2019-09-24 PROCEDURE — 76040000019: Performed by: SPECIALIST

## 2019-09-24 PROCEDURE — 88305 TISSUE EXAM BY PATHOLOGIST: CPT

## 2019-09-24 PROCEDURE — 77030029384 HC SNR POLYP CAPTVR II BSC -B: Performed by: SPECIALIST

## 2019-09-24 PROCEDURE — 76060000031 HC ANESTHESIA FIRST 0.5 HR: Performed by: SPECIALIST

## 2019-09-24 RX ORDER — FLUMAZENIL 0.1 MG/ML
0.2 INJECTION INTRAVENOUS
Status: DISCONTINUED | OUTPATIENT
Start: 2019-09-24 | End: 2019-09-24 | Stop reason: HOSPADM

## 2019-09-24 RX ORDER — LIDOCAINE HYDROCHLORIDE 20 MG/ML
INJECTION, SOLUTION EPIDURAL; INFILTRATION; INTRACAUDAL; PERINEURAL AS NEEDED
Status: DISCONTINUED | OUTPATIENT
Start: 2019-09-24 | End: 2019-09-24 | Stop reason: HOSPADM

## 2019-09-24 RX ORDER — NALOXONE HYDROCHLORIDE 0.4 MG/ML
0.4 INJECTION, SOLUTION INTRAMUSCULAR; INTRAVENOUS; SUBCUTANEOUS
Status: DISCONTINUED | OUTPATIENT
Start: 2019-09-24 | End: 2019-09-24 | Stop reason: HOSPADM

## 2019-09-24 RX ORDER — SODIUM CHLORIDE 9 MG/ML
INJECTION, SOLUTION INTRAVENOUS
Status: DISCONTINUED | OUTPATIENT
Start: 2019-09-24 | End: 2019-09-24 | Stop reason: HOSPADM

## 2019-09-24 RX ORDER — SODIUM CHLORIDE 0.9 % (FLUSH) 0.9 %
5-40 SYRINGE (ML) INJECTION AS NEEDED
Status: DISCONTINUED | OUTPATIENT
Start: 2019-09-24 | End: 2019-09-24 | Stop reason: HOSPADM

## 2019-09-24 RX ORDER — PROPOFOL 10 MG/ML
INJECTION, EMULSION INTRAVENOUS AS NEEDED
Status: DISCONTINUED | OUTPATIENT
Start: 2019-09-24 | End: 2019-09-24 | Stop reason: HOSPADM

## 2019-09-24 RX ORDER — SODIUM CHLORIDE 9 MG/ML
50 INJECTION, SOLUTION INTRAVENOUS CONTINUOUS
Status: DISCONTINUED | OUTPATIENT
Start: 2019-09-24 | End: 2019-09-24 | Stop reason: HOSPADM

## 2019-09-24 RX ORDER — EPINEPHRINE 0.1 MG/ML
1 INJECTION INTRACARDIAC; INTRAVENOUS
Status: DISCONTINUED | OUTPATIENT
Start: 2019-09-24 | End: 2019-09-24 | Stop reason: HOSPADM

## 2019-09-24 RX ORDER — ATROPINE SULFATE 0.1 MG/ML
0.5 INJECTION INTRAVENOUS
Status: DISCONTINUED | OUTPATIENT
Start: 2019-09-24 | End: 2019-09-24 | Stop reason: HOSPADM

## 2019-09-24 RX ORDER — SODIUM CHLORIDE 0.9 % (FLUSH) 0.9 %
5-40 SYRINGE (ML) INJECTION EVERY 8 HOURS
Status: DISCONTINUED | OUTPATIENT
Start: 2019-09-24 | End: 2019-09-24 | Stop reason: HOSPADM

## 2019-09-24 RX ORDER — DEXTROMETHORPHAN/PSEUDOEPHED 2.5-7.5/.8
1.2 DROPS ORAL
Status: DISCONTINUED | OUTPATIENT
Start: 2019-09-24 | End: 2019-09-24 | Stop reason: HOSPADM

## 2019-09-24 RX ADMIN — LIDOCAINE HYDROCHLORIDE 100 MG: 20 INJECTION, SOLUTION EPIDURAL; INFILTRATION; INTRACAUDAL; PERINEURAL at 08:53

## 2019-09-24 RX ADMIN — PROPOFOL 60 MG: 10 INJECTION, EMULSION INTRAVENOUS at 09:12

## 2019-09-24 RX ADMIN — PROPOFOL 50 MG: 10 INJECTION, EMULSION INTRAVENOUS at 08:56

## 2019-09-24 RX ADMIN — PROPOFOL 50 MG: 10 INJECTION, EMULSION INTRAVENOUS at 08:58

## 2019-09-24 RX ADMIN — PROPOFOL 50 MG: 10 INJECTION, EMULSION INTRAVENOUS at 09:00

## 2019-09-24 RX ADMIN — PROPOFOL 50 MG: 10 INJECTION, EMULSION INTRAVENOUS at 09:02

## 2019-09-24 RX ADMIN — PROPOFOL 60 MG: 10 INJECTION, EMULSION INTRAVENOUS at 09:07

## 2019-09-24 RX ADMIN — PROPOFOL 60 MG: 10 INJECTION, EMULSION INTRAVENOUS at 09:10

## 2019-09-24 RX ADMIN — PROPOFOL 100 MG: 10 INJECTION, EMULSION INTRAVENOUS at 08:53

## 2019-09-24 RX ADMIN — SIMETHICONE 80 MG: 20 SUSPENSION/ DROPS ORAL at 09:07

## 2019-09-24 RX ADMIN — SODIUM CHLORIDE: 900 INJECTION, SOLUTION INTRAVENOUS at 08:45

## 2019-09-24 RX ADMIN — PROPOFOL 50 MG: 10 INJECTION, EMULSION INTRAVENOUS at 09:04

## 2019-09-24 RX ADMIN — PROPOFOL 50 MG: 10 INJECTION, EMULSION INTRAVENOUS at 08:54

## 2019-09-24 NOTE — H&P
Colonoscopy History and Physical      The patient was seen and examined. Date of last colonoscopy: none, Polyps  No      The airway was assessed and documented. The problem list, past medical history, and medications were reviewed. Patient Active Problem List   Diagnosis Code    Hypercholesterolemia E78.00    Hypertension, benign I10    Severe obesity ([facility] [address] E66.01     Social History     Socioeconomic History    Marital status:      Spouse name: Not on file    Number of children: Not on file    Years of education: Not on file    Highest education level: Not on file   Occupational History    Not on file   Social Needs    Financial resource strain: Not on file    Food insecurity:     Worry: Not on file     Inability: Not on file    Transportation needs:     Medical: Not on file     Non-medical: Not on file   Tobacco Use    Smoking status: Never Smoker    Smokeless tobacco: Never Used   Substance and Sexual Activity    Alcohol use: Not Currently    Drug use: Not Currently    Sexual activity: Yes     Partners: Female   Lifestyle    Physical activity:     Days per week: Not on file     Minutes per session: Not on file    Stress: Not on file   Relationships    Social connections:     Talks on phone: Not on file     Gets together: Not on file     Attends [employer] service: Not on file     Active member of club or organization: Not on file     Attends meetings of clubs or organizations: Not on file     Relationship status: Not on file    Intimate partner violence:     Fear of current or ex partner: Not on file     Emotionally abused: Not on file     Physically abused: Not on file     Forced sexual activity: Not on file   Other Topics Concern    Not on file   Social History Narrative    Not on file     Past Medical History:   Diagnosis Date    Hypercholesterolemia     Hypertension, benign          Prior to Admission Medications   Prescriptions Last Dose Informant Patient Reported? Taking? OTHER 9/17/2019 at Unknown time  Yes Yes   Sig: Indications: Multivitamin with Testosterone Supplement - 1 tablet daily   amLODIPine (NORVASC) 10 mg tablet 9/24/2019 at Unknown time  No Yes   Sig: Take 1 Tab by mouth daily. losartan-hydroCHLOROthiazide (HYZAAR) 100-25 mg per tablet 9/24/2019 at Unknown time  No Yes   Sig: Take 1 Tab by mouth daily. multivitamin (ONE A DAY) tablet 9/17/2019 at Unknown time  Yes Yes   Sig: Take 1 Tab by mouth daily. omega-3 fatty acids-vitamin e (FISH OIL) 1,000 mg Cap 9/17/2019 at Unknown time  Yes Yes   Sig: Take  by mouth two (2) times a day. Facility-Administered Medications: None       The patient was seen and examined in the endoscopy suite. The airway was assessed and docuemented. The problem list and medications were reviewed. Chief complaint, history of present illness, and review of systems and Past medical History are positive for: colon cancer screening    The heart, lungs and mental status were satisfactory for the administration of sedation and for the procedure. I discussed with the patient the objectives, risks, consequences and alternatives to the procedure.      Plan: Endoscopic procedure with sedation     Denae Cavanaugh MD   9/24/2019  8:57 AM

## 2019-09-24 NOTE — PROGRESS NOTES

## 2019-09-24 NOTE — PROCEDURES
[address]  [address]                 Colonoscopy Procedure Note    Indications:   See Preoperative Diagnosis above  Referring Physician: Jah Funk NP  Anesthesia/Sedation: MAC anesthesia Propofol  Endoscopist:  Dr. Paris Law  Assistant:  Endoscopy Technician-1: Jann Fleming  Endoscopy RN-1: David Frost RN  Preoperative diagnosis: ABDOMINAL PAIN , SCREENING FOR MALIGNANT NEOPLASMS OF COLON , DIARRHEA  Postoperative diagnosis: 1. - Diverticulosis  2. - Rectal Polyp    Procedure in Detail:  Informed consent was obtained for the procedure, including sedation. Risks of perforation, hemorrhage, adverse drug reaction, and aspiration were discussed. The patient was placed in the left lateral decubitus position. Based on the pre-procedure assessment, including review of the patient's medical history, medications, allergies, and review of systems, he had been deemed to be an appropriate candidate for moderate sedation; he was therefore sedated with the medications listed above. The patient was monitored continuously with ECG tracing, pulse oximetry, blood pressure monitoring, and direct observations. A rectal examination was performed. The [id number] was inserted into the rectum and advanced under direct vision to the cecum, which was identified by the ileocecal valve and appendiceal orifice. The quality of the colonic preparation was good. A careful inspection was made as the colonoscope was withdrawn, including a retroflexed view of the rectum; findings and interventions are described below. Appropriate photodocumentation was obtained. Findings:  Rectum: 4 mm polyp removed with cold snare  Sigmoid: moderate diverticulosis; Descending Colon: moderate diverticulosis;  Transverse Colon: moderate diverticulosis;   Ascending Colon: normal  Cecum: normal      Specimens:    rectal polyp    EBL: None    Complications: None; patient tolerated the procedure well. Recommendations:     - Await pathology. - If adenoma is present, repeat colonoscopy in 5 years. - High fiber diet.         Signed By: Clare Billy MD                        September 24, 2019

## 2019-09-24 NOTE — ANESTHESIA PREPROCEDURE EVALUATION
Relevant Problems   No relevant active problems       Anesthetic History   No history of anesthetic complications            Review of Systems / Medical History  Patient summary reviewed, nursing notes reviewed and pertinent labs reviewed    Pulmonary  Within defined limits                 Neuro/Psych   Within defined limits           Cardiovascular  Within defined limits  Hypertension                   GI/Hepatic/Renal  Within defined limits              Endo/Other  Within defined limits      Obesity     Other Findings              Physical Exam    Airway  Mallampati: II  TM Distance: > 6 cm  Neck ROM: normal range of motion   Mouth opening: Normal     Cardiovascular  Regular rate and rhythm,  S1 and S2 normal,  no murmur, click, rub, or gallop             Dental  No notable dental hx       Pulmonary  Breath sounds clear to auscultation               Abdominal  GI exam deferred       Other Findings            Anesthetic Plan    ASA: 2  Anesthesia type: MAC            Anesthetic plan and risks discussed with: Patient

## 2019-09-24 NOTE — ROUTINE PROCESS
Latosha Arrow 1960 
[id number] Situation: 
Verbal report received from: Garret Barajas RN Procedure: Procedure(s): 
COLONOSCOPY 
ENDOSCOPIC POLYPECTOMY Background: 
 
Preoperative diagnosis: ABDOMINAL PAIN , SCREENING FOR MALIGNANT NEOPLASMS OF COLON , DIARRHEA Postoperative diagnosis: 1. - Diverticulosis 2. - Rectal Polyp :  Dr. Mary Gauthier Assistant(s): Endoscopy Technician-1: Thierno Eason Endoscopy RN-1: Mesis Hastings RN Specimens:  
ID Type Source Tests Collected by Time Destination 1 : Rectal Polyp Preservative   Kylah Corea MD 9/24/2019 [address] Pylori  no Assessment: 
Intra-procedure medications Anesthesia gave intra-procedure sedation and medications, see anesthesia flow sheet yes Intravenous fluids: NS@ Nura Messenger Vital signs stable Abdominal assessment: round and soft Recommendation: 
Discharge patient per MD order. Family or Friend Permission to share finding with family or friend yes

## 2019-09-24 NOTE — ANESTHESIA POSTPROCEDURE EVALUATION
Procedure(s):  COLONOSCOPY  ENDOSCOPIC POLYPECTOMY. MAC    <BSHSIANPOST>    Vitals Value Taken Time   BP 0/0 9/24/2019  9:45 AM   Temp 36.6 °C (97.8 °F) 9/24/2019  9:30 AM   Pulse 0 9/24/2019  9:45 AM   Resp 0 9/24/2019  9:48 AM   SpO2 97 % 9/24/2019  9:39 AM   Vitals shown include unvalidated device data.

## 2019-09-24 NOTE — DISCHARGE INSTRUCTIONS
Gilda Presidio  [id number]  1960    COLON/EGD DISCHARGE INSTRUCTIONS  Discomfort:  Redness at IV site- apply warm compress to area; if redness or soreness persist- contact your physician  There may be a slight amount of blood passed from the rectum  Gaseous discomfort- walking, belching will help relieve any discomfort  Sore throat- throat lozenges or warm salt water gargle  You may not operate a vehicle for 12 hours  You may not engage in an occupation involving machinery or appliances for rest of today  You may not drink alcoholic beverages for at least 12 hours  Avoid making any critical decisions for at least 24 hour  DIET:   High fiber diet. - however -  remember your colon is empty and a heavy meal will produce gas. Avoid these foods:  vegetables, fried / greasy foods, carbonated drinks for today. MEDICATIONS:      Regarding Aspirin or Nonsteroidal medications, please see below. ACTIVITY:  You may resume your normal daily activities it is recommended that you spend the remainder of the day resting -  avoid any strenuous activity. CALL M.D. ANY SIGN OF:  Increasing pain, nausea, vomiting  Abdominal distension (swelling)  New increased bleeding (oral or rectal)  Fever (chills)  Pain in chest area  Bloody discharge from nose or mouth  Shortness of breath    You may not  take any Advil, Aspirin, Ibuprofen, Motrin, Aleve, or Goodys for 10 days, ONLY  Tylenol as needed for pain.     Post procedure diagnosis: 1. - Diverticulosis  2. - Rectal Polyp      Follow-up Instructions:   Call Dr. Jahaira Barbosa  Results of procedure / biopsy in 10 days  Telephone #  [phone]      DISCHARGE SUMMARY from Nurse    The following personal items collected during your admission are returned to you:   Dental Appliance: Dental Appliances: None  Vision: Visual Aid: None  Hearing Aid:    Jewelry:    Clothing:    Other Valuables:    Valuables sent to safe:

## 2020-08-31 NOTE — TELEPHONE ENCOUNTER
Last Refill: 4/22/2020  Last Visit:  9/11/2019  Next Visit: 9/2/2020    Requested Prescriptions     Pending Prescriptions Disp Refills    amLODIPine (NORVASC) 10 mg tablet 90 Tab 3     Sig: Take 1 Tab by mouth daily.  losartan-hydroCHLOROthiazide (HYZAAR) 100-25 mg per tablet 90 Tab 3     Sig: Take 1 Tab by mouth daily.

## 2020-08-31 NOTE — TELEPHONE ENCOUNTER
PCP: Diana Hawthorne NP    Last appt: 9/11/2019    Future Appointments   Date Time Provider Dav Goss   9/2/2020  9:00 AM Moshe Rosa NP [facility]       Requested Prescriptions     Pending Prescriptions Disp Refills    amLODIPine (NORVASC) 10 mg tablet 90 Tab 3     Sig: Take 1 Tab by mouth daily.  losartan-hydroCHLOROthiazide (HYZAAR) 100-25 mg per tablet 90 Tab 3     Sig: Take 1 Tab by mouth daily.        Prior labs and Blood pressures:  BP Readings from Last 3 Encounters:   09/24/19 113/66   09/11/19 128/82   07/25/19 156/85     Lab Results   Component Value Date/Time    Sodium 142 07/25/2019 04:03 PM    Potassium 3.0 (L) 07/25/2019 04:03 PM    Chloride 102 07/25/2019 04:03 PM    CO2 33 (H) 07/25/2019 04:03 PM    Anion gap 7 07/25/2019 04:03 PM    Glucose 100 07/25/2019 04:03 PM    BUN 16 07/25/2019 04:03 PM    Creatinine 1.33 (H) 07/25/2019 04:03 PM    BUN/Creatinine ratio 12 07/25/2019 04:03 PM    GFR est AA >60 07/25/2019 04:03 PM    GFR est non-AA 56 (L) 07/25/2019 04:03 PM    Calcium 10.1 07/25/2019 04:03 PM     Lab Results   Component Value Date/Time    Hemoglobin A1c 5.7 (H) 06/11/2019 10:42 AM     Lab Results   Component Value Date/Time    Cholesterol, total 174 06/11/2019 10:43 AM    HDL Cholesterol 36 06/11/2019 10:43 AM    LDL, calculated 118 06/11/2019 10:43 AM    VLDL 20 06/11/2019 10:43 AM    Triglyceride 101 06/11/2019 10:43 AM    CHOL/HDL Ratio 5 (H) 06/11/2019 10:43 AM     Lab Results   Component Value Date/Time    VITAMIN D, 25-HYDROXY 30 06/11/2019 10:44 AM       Lab Results   Component Value Date/Time    TSH, 3rd generation 1.68 06/11/2019 10:44 AM

## 2020-09-01 RX ORDER — AMLODIPINE BESYLATE 10 MG/1
10 TABLET ORAL DAILY
Qty: 90 TAB | Refills: 3 | Status: SHIPPED | OUTPATIENT
Start: 2020-09-01 | End: 2021-12-21 | Stop reason: SDUPTHER

## 2020-09-01 RX ORDER — LOSARTAN POTASSIUM AND HYDROCHLOROTHIAZIDE 25; 100 MG/1; MG/1
1 TABLET ORAL DAILY
Qty: 90 TAB | Refills: 3 | Status: SHIPPED | OUTPATIENT
Start: 2020-09-01 | End: 2021-12-21 | Stop reason: SDUPTHER

## 2020-09-02 ENCOUNTER — OFFICE VISIT (OUTPATIENT)
Dept: INTERNAL MEDICINE CLINIC | Age: 60
End: 2020-09-02
Payer: MEDICAID

## 2020-09-02 VITALS
DIASTOLIC BLOOD PRESSURE: 82 MMHG | RESPIRATION RATE: 16 BRPM | BODY MASS INDEX: 33.7 KG/M2 | HEIGHT: 70 IN | HEART RATE: 65 BPM | OXYGEN SATURATION: 97 % | WEIGHT: 235.4 LBS | TEMPERATURE: 97.4 F | SYSTOLIC BLOOD PRESSURE: 140 MMHG

## 2020-09-02 DIAGNOSIS — N52.9 ERECTILE DYSFUNCTION, UNSPECIFIED ERECTILE DYSFUNCTION TYPE: ICD-10-CM

## 2020-09-02 DIAGNOSIS — Z23 ENCOUNTER FOR IMMUNIZATION: ICD-10-CM

## 2020-09-02 DIAGNOSIS — E55.9 VITAMIN D DEFICIENCY: ICD-10-CM

## 2020-09-02 DIAGNOSIS — Z13.1 SCREENING FOR DIABETES MELLITUS: ICD-10-CM

## 2020-09-02 DIAGNOSIS — I10 ESSENTIAL HYPERTENSION: ICD-10-CM

## 2020-09-02 DIAGNOSIS — Z00.00 PHYSICAL EXAM: Primary | ICD-10-CM

## 2020-09-02 DIAGNOSIS — E78.2 MIXED HYPERLIPIDEMIA: ICD-10-CM

## 2020-09-02 DIAGNOSIS — R53.83 FATIGUE, UNSPECIFIED TYPE: ICD-10-CM

## 2020-09-02 LAB
A-G RATIO,AGRAT: 1.8 RATIO
ALBUMIN SERPL-MCNC: 4.5 G/DL (ref 3.9–5.4)
ALP SERPL-CCNC: 65 U/L (ref 38–126)
ALT SERPL-CCNC: 17 U/L (ref 0–50)
ANION GAP SERPL CALC-SCNC: 12 MMOL/L
AST SERPL W P-5'-P-CCNC: 24 U/L (ref 14–36)
BILIRUB SERPL-MCNC: 0.8 MG/DL (ref 0.2–1.3)
BILIRUB UR QL: NEGATIVE
BUN SERPL-MCNC: 12 MG/DL (ref 9–20)
BUN/CREATININE RATIO,BUCR: 12 RATIO
CALCIUM SERPL-MCNC: 9.8 MG/DL (ref 8.4–10.2)
CHLORIDE SERPL-SCNC: 106 MMOL/L (ref 98–107)
CHOL/HDL RATIO,CHHD: 4 RATIO (ref 0–4)
CHOLEST SERPL-MCNC: 157 MG/DL (ref 0–200)
CLARITY: CLEAR
CO2 SERPL-SCNC: 28 MMOL/L (ref 22–32)
COLOR UR: NORMAL
CREAT SERPL-MCNC: 1 MG/DL (ref 0.8–1.5)
ERYTHROCYTE [DISTWIDTH] IN BLOOD BY AUTOMATED COUNT: 14.9 %
GLOBULIN,GLOB: 2.5
GLUCOSE 24H UR-MRATE: NEGATIVE G/(24.H)
GLUCOSE SERPL-MCNC: 98 MG/DL (ref 75–110)
HBA1C MFR BLD HPLC: 5.6 % (ref 4–5.7)
HCT VFR BLD AUTO: 47.2 % (ref 37–51)
HDLC SERPL-MCNC: 37 MG/DL (ref 35–130)
HGB BLD-MCNC: 15.4 G/DL (ref 12–18)
HGB UR QL STRIP: NEGATIVE
KETONES UR QL STRIP.AUTO: NEGATIVE
LDL/HDL RATIO,LDHD: 3 RATIO
LDLC SERPL CALC-MCNC: 100 MG/DL (ref 0–130)
LEUKOCYTE ESTERASE: NEGATIVE
LYMPHOCYTES ABSOLUTE: 2.3 K/UL (ref 0.6–4.1)
LYMPHOCYTES NFR BLD: 28.9 % (ref 10–58.5)
MCH RBC QN AUTO: 29.8 PG (ref 26–32)
MCHC RBC AUTO-ENTMCNC: 32.6 G/DL (ref 30–36)
MCV RBC AUTO: 91.2 FL (ref 80–97)
MONOCYTES ABS-DIF,2141: 0.6 K/UL (ref 0–1.8)
MONOCYTES NFR BLD: 7.4 % (ref 0.1–24)
NEUTROPHILS # BLD: 63.7 % (ref 37–92)
NEUTROPHILS ABS,2156: 5.2 K/UL (ref 2–7.8)
NITRITE UR QL STRIP.AUTO: NEGATIVE
PH UR STRIP: 5 [PH] (ref 5–7)
PLATELET # BLD AUTO: 251 K/UL (ref 140–440)
POTASSIUM SERPL-SCNC: 3.9 MMOL/L (ref 3.6–5)
PROT SERPL-MCNC: 7 G/DL (ref 6.3–8.2)
PROT UR STRIP-MCNC: NEGATIVE MG/DL
RBC # BLD AUTO: 5.17 M/UL (ref 4.2–6.3)
SODIUM SERPL-SCNC: 146 MMOL/L (ref 137–145)
SP GR UR REFRACTOMETRY: 1.02 (ref 1–1.03)
TRIGL SERPL-MCNC: 100 MG/DL (ref 0–200)
UROBILINOGEN UR QL STRIP.AUTO: NEGATIVE
VLDLC SERPL CALC-MCNC: 20 MG/DL
WBC # BLD AUTO: 8.1 K/UL (ref 4.1–10.9)

## 2020-09-02 PROCEDURE — 99396 PREV VISIT EST AGE 40-64: CPT | Performed by: NURSE PRACTITIONER

## 2020-09-02 PROCEDURE — 80053 COMPREHEN METABOLIC PANEL: CPT | Performed by: NURSE PRACTITIONER

## 2020-09-02 PROCEDURE — 84439 ASSAY OF FREE THYROXINE: CPT | Performed by: NURSE PRACTITIONER

## 2020-09-02 PROCEDURE — 85025 COMPLETE CBC W/AUTO DIFF WBC: CPT | Performed by: NURSE PRACTITIONER

## 2020-09-02 PROCEDURE — 80061 LIPID PANEL: CPT | Performed by: NURSE PRACTITIONER

## 2020-09-02 PROCEDURE — 84443 ASSAY THYROID STIM HORMONE: CPT | Performed by: NURSE PRACTITIONER

## 2020-09-02 PROCEDURE — 84153 ASSAY OF PSA TOTAL: CPT | Performed by: NURSE PRACTITIONER

## 2020-09-02 PROCEDURE — 90471 IMMUNIZATION ADMIN: CPT | Performed by: NURSE PRACTITIONER

## 2020-09-02 PROCEDURE — 90715 TDAP VACCINE 7 YRS/> IM: CPT | Performed by: NURSE PRACTITIONER

## 2020-09-02 PROCEDURE — 82306 VITAMIN D 25 HYDROXY: CPT | Performed by: NURSE PRACTITIONER

## 2020-09-02 PROCEDURE — 81003 URINALYSIS AUTO W/O SCOPE: CPT | Performed by: NURSE PRACTITIONER

## 2020-09-02 PROCEDURE — 83036 HEMOGLOBIN GLYCOSYLATED A1C: CPT | Performed by: NURSE PRACTITIONER

## 2020-09-02 NOTE — PROGRESS NOTES
Subjective:  Mr. Hue Bro is a pleasant 61-year-old gentleman, who comes in today for his updated history and physical.  He has no complaints. Past Medical History:   Diagnosis Date    Hypercholesterolemia     Hypertension, benign      Past Surgical History:   Procedure Laterality Date    COLONOSCOPY N/A 2019    COLONOSCOPY performed by Ping Vazquez MD at [facility] ENDOSCOPY    HX TONSILLECTOMY      HX WISDOM TEETH EXTRACTION         Current Outpatient Medications on File Prior to Visit   Medication Sig Dispense Refill    amLODIPine (NORVASC) 10 mg tablet Take 1 Tab by mouth daily. 90 Tab 3    losartan-hydroCHLOROthiazide (HYZAAR) 100-25 mg per tablet Take 1 Tab by mouth daily. 90 Tab 3    multivitamin (ONE A DAY) tablet Take 1 Tab by mouth daily.  OTHER Indications: Multivitamin with Testosterone Supplement - 1 tablet daily      omega-3 fatty acids-vitamin e (FISH OIL) 1,000 mg Cap Take  by mouth two (2) times a day. No current facility-administered medications on file prior to visit. Allergies   Allergen Reactions    Erythromycin Nausea and Vomiting     Past Medical History/Surgeries:    1. Tonsillectomy. Illnesses:  1. Hypertension for the past ten years. He tells me that he was off of all of his medications for about five days. He got all of his refills yesterday and restarted his medication this morning. Family History:  Father  at 46 of complications of lymphoma. Mother  at 52 of a heart attack. Two siblings are living, both his brother and sister are diabetic. Social History:  He is a , but in the past year he has met this young woman and they are now engaged. He has two daughters living and well. He is self-employed doing power washing. Allergies:  Erythromycin, which causes nausea. Medications:  1. Amlodipine 10 mg daily. 2. Losartan/Hydrochlorothiazide 100/25 mg daily. 3. Omega 3 fatty acid 1,000 mg twice daily.     Habits:  Nonsmoker and a non drinker. Review of Systems:  HEENT:  Denies any headaches, dizziness or blurred vision. He does wear reading glasses. CVR:  Denies any chest pain or palpitations. Denies any shortness of breath, cough, wheezing, PND or orthopnea. Denies any ankle edema. GI:  Appetite is good, weight is stable. Does have a normal bowel pattern without presence of blood in stools or melena. He did get a colonoscopy last year and is scheduled for a repeat one in five years. :  Denies any urinary symptoms. Physical Examination:  GENERAL:  Pleasant gentleman in no acute distress. He is alert and oriented. He answers my questions appropriately. VITALS:  Blood Pressure:  140/82. Pulse:  65. Temperature:  97.4. O2 sat:  97. HEENT:  Normocephalic, atraumatic. PERRLA, EOMI. TMs normal.  Mouth mucosa pink. Tongue midline. Pharynx normal.  NECK:  Supple without adenopathy, thyromegaly or carotid bruits. CHEST:  Lungs clear to auscultation, no rales or wheezes. CV:  Heart regular rhythm without murmur or gallop. ABDOMEN:  Soft, non tender, no organomegaly or masses. EXTREMITIES:  No edema or calf tenderness. Distal pulses were present. NEUROLOGIC:  Cranial nerves II-XII intact. Excellent strength in the upper and lower extremities against resistance. Sensation is preserved. Romberg is negative. Impression:  1. Hypertension, mildly elevated secondary to being off of medication. 2. Normal physical exam.    Plan:  1. He was fasting this morning, so therefore it was opted to do all of his lab studies. I will call him as soon as I have his results. Otherwise I will continue to see him yearly. 2. While in the office today we did update his TDAP. He tolerated it well. 3. He will continue with a prudent diet, exercise and weight management to keep his BMI within acceptable level.

## 2020-09-02 NOTE — PATIENT INSTRUCTIONS
DTaP (Diphtheria, Tetanus, Pertussis) Vaccine: What You Need to Know Why get vaccinated? DTaP vaccine can prevent diphtheria, tetanus, and pertussis. Diphtheria and pertussis spread from person to person. Tetanus enters the body through cuts or wounds. · DIPHTHERIA (D) can lead to difficulty breathing, heart failure, paralysis, or death. · TETANUS (T) causes painful stiffening of the muscles. Tetanus can lead to serious health problems, including being unable to open the mouth, having trouble swallowing and breathing, or death. · PERTUSSIS (aP), also known as \"whooping cough,\" can cause uncontrollable, violent coughing which makes it hard to breathe, eat, or drink. Pertussis can be extremely serious in babies and young children, causing pneumonia, convulsions, brain damage, or death. In teens and adults, it can cause weight loss, loss of bladder control, passing out, and rib fractures from severe coughing. DTaP vaccine DTaP is only for children younger than 9years old. Different vaccines against tetanus, diphtheria, and pertussis (Tdap and Td) are available for older children, adolescents, and adults. It is recommended that children receive 5 doses of DTaP, usually at the following ages: · 2 months · 4 months · 6 months · 1518 months · 46 years DTaP may be given as a stand-alone vaccine, or as part of a combination vaccine (a type of vaccine that combines more than one vaccine together into one shot). DTaP may be given at the same time as other vaccines. Talk with your health care provider Tell your vaccine provider if the person getting the vaccine: 
· Has had an allergic reaction after a previous dose of any vaccine that protects against tetanus, diphtheria, or pertussis, or has any severe, life threatening allergies. · Has had a coma, decreased level of consciousness, or prolonged seizures within 7 days after a previous dose of any pertussis vaccine (DTP or DTaP). · Has seizures or another nervous system problem. · Has ever had Guillain-Barré Syndrome (also called GBS). · Has had severe pain or swelling after a previous dose of any vaccine that protects against tetanus or diphtheria. In some cases, your child's health care provider may decide to postpone DTaP vaccination to a future visit. Children with minor illnesses, such as a cold, may be vaccinated. Children who are moderately or severely ill should usually wait until they recover before getting DTaP. Your child's health care provider can give you more information. Risks of a vaccine reaction · Soreness or swelling where the shot was given, fever, fussiness, feeling tired, loss of appetite, and vomiting sometimes happen after DTaP vaccination. · More serious reactions, such as seizures, non-stop crying for 3 hours or more, or high fever (over 105°F) after DTaP vaccination happen much less often. Rarely, the vaccine is followed by swelling of the entire arm or leg, especially in older children when they receive their fourth or fifth dose. · Very rarely, long-term seizures, coma, lowered consciousness, or permanent brain damage may happen after DTaP vaccination. As with any medicine, there is a very remote chance of a vaccine causing a severe allergic reaction, other serious injury, or death. What if there is a serious problem? An allergic reaction could occur after the vaccinated person leaves the clinic. If you see signs of a severe allergic reaction (hives, swelling of the face and throat, difficulty breathing, a fast heartbeat, dizziness, or weakness), call 9-1-1 and get the person to the nearest hospital. 
For other signs that concern you, call your health care provider. Adverse reactions should be reported to the Vaccine Adverse Event Reporting System (VAERS). Your health care provider will usually file this report, or you can do it yourself.  Visit the VAERS website at www.vaers. [facility].gov or call 2-191.208.3084. VAERS is only for reporting reactions, and VAERS staff do not give medical advice. The National Vaccine Injury Compensation Program 
The National Vaccine Injury Compensation Program (VICP) is a federal program that was created to compensate people who may have been injured by certain vaccines. Visit the VICP website at www.[facility].gov/vaccinecompensation or call 7-990.198.1905 to learn about the program and about filing a claim. There is a time limit to file a claim for compensation. How can I learn more? · Ask your health care provider. · Call your local or state health department. · Contact the Centers for Disease Control and Prevention (CDC): 
? Call 4-796.939.8000 (1-800-CDC-INFO) or 
? Visit CDC's website at www.cdc.gov/vaccines Vaccine Information Statement (Interim) DTaP (Diphtheria, Tetanus, Pertussis) Vaccine 04/01/2020 
[address] [id number] [employer] Centers for Disease Control and Prevention Many Vaccine Information Statements are available in Irish and other languages. See www.immunize.org/vis. Muchas hojas de información sobre vacunas están disponibles en español y en otros idiomas. Visite www.immunize.org/vis. Care instructions adapted under license by [employer] (which disclaims liability or warranty for this information). If you have questions about a medical condition or this instruction, always ask your healthcare professional. [place] [address] any warranty or liability for your use of this information.

## 2020-09-02 NOTE — PROGRESS NOTES
Ramandeep Wilkins is a 64 y.o. male     Chief Complaint   Patient presents with    Complete Physical       Visit Vitals  /82 (BP 1 Location: Left arm, BP Patient Position: Sitting)   Pulse 65   Temp 97.4 °F (36.3 °C) (Oral)   Resp 16   Ht 5' 10\" (1.778 m)   Wt 235 lb 6.4 oz (106.8 kg)   SpO2 97%   BMI 33.78 kg/m²       Health Maintenance Due   Topic Date Due    DTaP/Tdap/Td series (1 - Tdap) 09/07/1983    Shingrix Vaccine Age 50> (1 of 2) 09/07/2012    FOBT Q1Y Age 50-75  09/07/2012    Flu Vaccine (1) 09/01/2020       1. Have you been to the ER, urgent care clinic since your last visit? Hospitalized since your last visit? No    2. Have you seen or consulted any other health care providers outside of the [address] [place] [address] since your last visit? Include any pap smears or colon screening. No      Administered Tdap in left deltoid patient tolerated injection well.     Lot#:[id number]    Exp:10/8/2022    IZK:[id number]

## 2020-09-03 LAB
25(OH)D3 SERPL-MCNC: 36 NG/ML (ref 30–96)
PSA, TEST22: 0.9 NG/ML (ref 0–4)
T4 FREE SERPL-MCNC: 0.93 NG/DL (ref 0.58–2.3)
TSH SERPL DL<=0.05 MIU/L-ACNC: 1.36 UIU/ML (ref 0.34–5.6)

## 2021-12-09 ENCOUNTER — TELEPHONE (OUTPATIENT)
Dept: INTERNAL MEDICINE CLINIC | Age: 61
End: 2021-12-09

## 2021-12-09 NOTE — TELEPHONE ENCOUNTER
----- Message from Mike Banks sent at 12/9/2021 10:37 AM EST -----  Subject: Appointment Request    Reason for Call: Routine Physical Exam    QUESTIONS  Type of Appointment? Established Patient  Reason for appointment request? No appointments available during search  Additional Information for Provider? Pt would like annual physical with   blood work; no dates populated. ---------------------------------------------------------------------------  --------------  Malik LEE  What is the best way for the office to contact you? OK to leave message on   voicemail  Preferred Call Back Phone Number? [phone]  ---------------------------------------------------------------------------  --------------  SCRIPT ANSWERS  Relationship to Patient? Self  (If the patient has Medicare as their primary insurance coverage ask this   question) Are you requesting a Medicare Annual Wellness Visit? No  (Is the patient requesting a pap smear with their physical exam?)? No  (Is the patient requesting their annual physical and does not need PAP or   AWV per above?)? Yes   Have you been diagnosed with, awaiting test results for, or told that you   are suspected of having COVID-19 (Coronavirus)? (If patient has tested   negative or was tested as a requirement for work, school, or travel and   not based on symptoms, answer no)? No  Within the past two weeks have you developed any of the following symptoms   (answer no if symptoms have been present longer than 2 weeks or began   more than 2 weeks ago)? Fever or Chills, Cough, Shortness of breath or   difficulty breathing, Loss of taste or smell, Sore throat, Nasal   congestion, Sneezing or runny nose, Fatigue or generalized body aches   (answer no if pain is specific to a body part e.g. back pain), Diarrhea,   Headache? No  Have you had close contact with someone with COVID-19 in the last 14 days?    No  (Service Expert  click yes below to proceed with [employer] As Usual Scheduling)?  Yes

## 2021-12-09 NOTE — TELEPHONE ENCOUNTER
Spoke to patient regarding appointment needed for yearly physical. Advised patient that Ephraim Webb was retiring at the end of this year. Offered patient available appointment on 12/21/21 at 1:30pm. Advised patient that fasting labs will be needed, patient said he was fine with fasting until 1:30 and didn't need an earlier lab appointment.

## 2021-12-21 ENCOUNTER — OFFICE VISIT (OUTPATIENT)
Dept: INTERNAL MEDICINE CLINIC | Age: 61
End: 2021-12-21
Payer: COMMERCIAL

## 2021-12-21 VITALS
HEART RATE: 58 BPM | RESPIRATION RATE: 18 BRPM | OXYGEN SATURATION: 95 % | WEIGHT: 237.9 LBS | DIASTOLIC BLOOD PRESSURE: 86 MMHG | SYSTOLIC BLOOD PRESSURE: 140 MMHG | HEIGHT: 70 IN | BODY MASS INDEX: 34.06 KG/M2

## 2021-12-21 DIAGNOSIS — Z13.1 SCREENING FOR DIABETES MELLITUS: ICD-10-CM

## 2021-12-21 DIAGNOSIS — Z00.00 PHYSICAL EXAM: Primary | ICD-10-CM

## 2021-12-21 DIAGNOSIS — R53.83 FATIGUE, UNSPECIFIED TYPE: ICD-10-CM

## 2021-12-21 DIAGNOSIS — N52.9 ERECTILE DYSFUNCTION, UNSPECIFIED ERECTILE DYSFUNCTION TYPE: ICD-10-CM

## 2021-12-21 DIAGNOSIS — E78.2 MIXED HYPERLIPIDEMIA: ICD-10-CM

## 2021-12-21 DIAGNOSIS — I10 ESSENTIAL HYPERTENSION: ICD-10-CM

## 2021-12-21 DIAGNOSIS — E55.9 VITAMIN D DEFICIENCY: ICD-10-CM

## 2021-12-21 LAB
25(OH)D3 SERPL-MCNC: 21.6 NG/ML (ref 30–100)
ALBUMIN SERPL-MCNC: 4.4 G/DL (ref 3.5–5)
ALBUMIN/GLOB SERPL: 1.6 {RATIO} (ref 1.1–2.2)
ALP SERPL-CCNC: 69 U/L (ref 45–117)
ALT SERPL-CCNC: 24 U/L (ref 12–78)
ANION GAP SERPL CALC-SCNC: 6 MMOL/L (ref 5–15)
APPEARANCE UR: CLEAR
AST SERPL-CCNC: 17 U/L (ref 15–37)
BASOPHILS # BLD: 0 K/UL (ref 0–0.1)
BASOPHILS NFR BLD: 0 % (ref 0–1)
BILIRUB SERPL-MCNC: 0.7 MG/DL (ref 0.2–1)
BILIRUB UR QL: NEGATIVE
BUN SERPL-MCNC: 14 MG/DL (ref 6–20)
BUN/CREAT SERPL: 14 (ref 12–20)
CALCIUM SERPL-MCNC: 9.5 MG/DL (ref 8.5–10.1)
CHLORIDE SERPL-SCNC: 106 MMOL/L (ref 97–108)
CHOLEST SERPL-MCNC: 174 MG/DL
CO2 SERPL-SCNC: 28 MMOL/L (ref 21–32)
COLOR UR: NORMAL
COMMENT, HOLDF: NORMAL
CREAT SERPL-MCNC: 0.98 MG/DL (ref 0.7–1.3)
DIFFERENTIAL METHOD BLD: NORMAL
EOSINOPHIL # BLD: 0.1 K/UL (ref 0–0.4)
EOSINOPHIL NFR BLD: 1 % (ref 0–7)
ERYTHROCYTE [DISTWIDTH] IN BLOOD BY AUTOMATED COUNT: 13 % (ref 11.5–14.5)
EST. AVERAGE GLUCOSE BLD GHB EST-MCNC: 108 MG/DL
GLOBULIN SER CALC-MCNC: 2.8 G/DL (ref 2–4)
GLUCOSE SERPL-MCNC: 76 MG/DL (ref 65–100)
GLUCOSE UR STRIP.AUTO-MCNC: NEGATIVE MG/DL
HBA1C MFR BLD: 5.4 % (ref 4–5.6)
HCT VFR BLD AUTO: 45.5 % (ref 36.6–50.3)
HDLC SERPL-MCNC: 35 MG/DL
HDLC SERPL: 5 {RATIO} (ref 0–5)
HGB BLD-MCNC: 15.3 G/DL (ref 12.1–17)
HGB UR QL STRIP: NEGATIVE
IMM GRANULOCYTES # BLD AUTO: 0 K/UL (ref 0–0.04)
IMM GRANULOCYTES NFR BLD AUTO: 0 % (ref 0–0.5)
KETONES UR QL STRIP.AUTO: NEGATIVE MG/DL
LDLC SERPL CALC-MCNC: 114.6 MG/DL (ref 0–100)
LEUKOCYTE ESTERASE UR QL STRIP.AUTO: NEGATIVE
LYMPHOCYTES # BLD: 3 K/UL (ref 0.8–3.5)
LYMPHOCYTES NFR BLD: 36 % (ref 12–49)
MCH RBC QN AUTO: 29.4 PG (ref 26–34)
MCHC RBC AUTO-ENTMCNC: 33.6 G/DL (ref 30–36.5)
MCV RBC AUTO: 87.5 FL (ref 80–99)
MONOCYTES # BLD: 0.7 K/UL (ref 0–1)
MONOCYTES NFR BLD: 9 % (ref 5–13)
NEUTS SEG # BLD: 4.5 K/UL (ref 1.8–8)
NEUTS SEG NFR BLD: 54 % (ref 32–75)
NITRITE UR QL STRIP.AUTO: NEGATIVE
NRBC # BLD: 0 K/UL (ref 0–0.01)
NRBC BLD-RTO: 0 PER 100 WBC
PH UR STRIP: 5.5 [PH] (ref 5–8)
PLATELET # BLD AUTO: 225 K/UL (ref 150–400)
PMV BLD AUTO: 10 FL (ref 8.9–12.9)
POTASSIUM SERPL-SCNC: 3.7 MMOL/L (ref 3.5–5.1)
PROT SERPL-MCNC: 7.2 G/DL (ref 6.4–8.2)
PROT UR STRIP-MCNC: NEGATIVE MG/DL
PSA SERPL-MCNC: 0.7 NG/ML (ref 0.01–4)
RBC # BLD AUTO: 5.2 M/UL (ref 4.1–5.7)
SAMPLES BEING HELD,HOLD: NORMAL
SODIUM SERPL-SCNC: 140 MMOL/L (ref 136–145)
SP GR UR REFRACTOMETRY: 1.02 (ref 1–1.03)
T4 FREE SERPL-MCNC: 1 NG/DL (ref 0.8–1.5)
TRIGL SERPL-MCNC: 122 MG/DL (ref ?–150)
TSH SERPL DL<=0.05 MIU/L-ACNC: 1.34 UIU/ML (ref 0.36–3.74)
UR CULT HOLD, URHOLD: NORMAL
UROBILINOGEN UR QL STRIP.AUTO: 0.2 EU/DL (ref 0.2–1)
VLDLC SERPL CALC-MCNC: 24.4 MG/DL
WBC # BLD AUTO: 8.3 K/UL (ref 4.1–11.1)

## 2021-12-21 PROCEDURE — 99396 PREV VISIT EST AGE 40-64: CPT | Performed by: NURSE PRACTITIONER

## 2021-12-21 RX ORDER — LOSARTAN POTASSIUM AND HYDROCHLOROTHIAZIDE 25; 100 MG/1; MG/1
1 TABLET ORAL DAILY
Qty: 90 TABLET | Refills: 3 | Status: SHIPPED | OUTPATIENT
Start: 2021-12-21

## 2021-12-21 RX ORDER — AMLODIPINE BESYLATE 10 MG/1
10 TABLET ORAL DAILY
Qty: 90 TABLET | Refills: 3 | Status: SHIPPED | OUTPATIENT
Start: 2021-12-21

## 2021-12-21 NOTE — PROGRESS NOTES
Subjective:  Mr. Carlo Baldwin is a pleasant gentleman who comes in today for his updated history and physical.  He has no complaints. Past Medical History:   Diagnosis Date    Hypercholesterolemia     Hypertension, benign      Past Surgical History:   Procedure Laterality Date    COLONOSCOPY N/A 2019    COLONOSCOPY performed by Renetta Millard MD at [facility] ENDOSCOPY    HX TONSILLECTOMY      HX WISDOM TEETH EXTRACTION         Current Outpatient Medications on File Prior to Visit   Medication Sig Dispense Refill    OTHER Indications: Multivitamin with Testosterone Supplement - 1 tablet daily      [DISCONTINUED] amLODIPine (NORVASC) 10 mg tablet Take 1 Tab by mouth daily. 90 Tab 3    [DISCONTINUED] losartan-hydroCHLOROthiazide (HYZAAR) 100-25 mg per tablet Take 1 Tab by mouth daily. 90 Tab 3    multivitamin (ONE A DAY) tablet Take 1 Tab by mouth daily. (Patient not taking: Reported on 2021)      omega-3 fatty acids-vitamin e (FISH OIL) 1,000 mg Cap Take  by mouth two (2) times a day. (Patient not taking: Reported on 2021)       No current facility-administered medications on file prior to visit. Allergies   Allergen Reactions    Erythromycin Nausea and Vomiting     Past Medical History/Surgeries:    1. Tonsillectomy. Illnesses:  1. Hypertension. Family History:  Father  at 46 of complications of lymphoma. Mother  at 52 of a heart attack. Two siblings are living, both his brother and sister are diabetic. Social History:  He recently remarried. He has two daughters living and well. He is self-employed doing power washing. Allergies:  Erythromycin, which caused nausea. Medications:  1. Amlodipine 10 mg daily. 2. Losartan/HCTZ 100/25 daily. 3. Multivitamin daily. Habits:  Nonsmoker and a non drinker. Review of Systems:  HEENT:  Denies any headaches, dizziness or blurred vision. He does wear reading glasses. CVR:  Denies any chest pain or palpitations.   Denies any syncopal episode. He denies any shortness of breath, cough, wheezing, PND or orthopnea. Denies any ankle edema. GI:  Appetite is good, weight is stable. Does have a normal bowel pattern without presence of blood in stools or melena. He is up to date with his colonoscopy. :  Denies any urinary symptoms. Immunizations:  He has completed his COVID vaccination, including his booster, as well as his flu vaccination. Physical Examination:  GENERAL:  Pleasant gentleman in no acute distress. He is alert and oriented. He answers my questions appropriately. VITALS:  Blood Pressure:  157/90, repeated by myself 140/86. Pulse:  57.  O2 sat:  95. HEENT:  Normocephalic, atraumatic. PERRLA, EOMI. TMs normal.  Mouth mucosa pink. Tongue midline. Pharynx normal.  NECK:  Supple without adenopathy, thyromegaly or carotid bruits. CHEST:  Lungs clear to auscultation, no rales or wheezes. CV:  Heart regular rhythm without murmur or gallop. EXTREMITIES:  No edema or calf tenderness. Distal pulses were present. NEUROLOGIC:  Cranial nerves II-XII intact. Excellent strength in the upper and lower extremities against resistance. Sensation is preserved. Romberg is negative. Reflexes 2+ and symmetrical.  He had excellent coordination. Impression:  1. Hypertension, stable. 2. Normal physical exam.    Plan:  1. He was fasting this morning, so it was opted to do all of his lab studies. I will call him as soon as I have his results. 2. Otherwise, he will continue with his current regimen and will continue to be seen yearly. 3. He is encouraged to continue with a prudent diet, exercise and weight management to keep BMI within acceptable level.

## 2021-12-21 NOTE — PROGRESS NOTES
1. Have you been to the ER, urgent care clinic since your last visit? Hospitalized since your last visit? No    2. Have you seen or consulted any other health care providers outside of the [address] [place] [address] since your last visit? Include any pap smears or colon screening.  No

## 2021-12-21 NOTE — PATIENT INSTRUCTIONS
High Blood Pressure: Care Instructions  Overview     It's normal for blood pressure to go up and down throughout the day. But if it stays up, you have high blood pressure. Another name for high blood pressure is hypertension. Despite what a lot of people think, high blood pressure usually doesn't cause headaches or make you feel dizzy or lightheaded. It usually has no symptoms. But it does increase your risk of stroke, heart attack, and other problems. You and your doctor will talk about your risks of these problems based on your blood pressure. Your doctor will give you a goal for your blood pressure. Your goal will be based on your health and your age. Lifestyle changes, such as eating healthy and being active, are always important to help lower blood pressure. You might also take medicine to reach your blood pressure goal.  Follow-up care is a key part of your treatment and safety. Be sure to make and go to all appointments, and call your doctor if you are having problems. It's also a good idea to know your test results and keep a list of the medicines you take. How can you care for yourself at home? Medical treatment  · If you stop taking your medicine, your blood pressure will go back up. You may take one or more types of medicine to lower your blood pressure. Be safe with medicines. Take your medicine exactly as prescribed. Call your doctor if you think you are having a problem with your medicine. · Talk to your doctor before you start taking aspirin every day. Aspirin can help certain people lower their risk of a heart attack or stroke. But taking aspirin isn't right for everyone, because it can cause serious bleeding. · See your doctor regularly. You may need to see the doctor more often at first or until your blood pressure comes down. · If you are taking blood pressure medicine, talk to your doctor before you take decongestants or anti-inflammatory medicine, such as ibuprofen.  Some of these medicines can raise blood pressure. · Learn how to check your blood pressure at home. Lifestyle changes  · Stay at a healthy weight. This is especially important if you put on weight around the waist. Losing even 10 pounds can help you lower your blood pressure. · If your doctor recommends it, get more exercise. Walking is a good choice. Bit by bit, increase the amount you walk every day. Try for at least 30 minutes on most days of the week. You also may want to swim, bike, or do other activities. · Avoid or limit alcohol. Talk to your doctor about whether you can drink any alcohol. · Try to limit how much sodium you eat to less than 2,300 milligrams (mg) a day. Your doctor may ask you to try to eat less than 1,500 mg a day. · Eat plenty of fruits (such as bananas and oranges), vegetables, legumes, whole grains, and low-fat dairy products. · Lower the amount of saturated fat in your diet. Saturated fat is found in animal products such as milk, cheese, and meat. Limiting these foods may help you lose weight and also lower your risk for heart disease. · Do not smoke. Smoking increases your risk for heart attack and stroke. If you need help quitting, talk to your doctor about stop-smoking programs and medicines. These can increase your chances of quitting for good. When should you call for help? Call 911  anytime you think you may need emergency care. This may mean having symptoms that suggest that your blood pressure is causing a serious heart or blood vessel problem. Your blood pressure may be over 180/120. For example, call 911 if:    · You have symptoms of a heart attack. These may include:  ? Chest pain or pressure, or a strange feeling in the chest.  ? Sweating. ? Shortness of breath. ? Nausea or vomiting. ? Pain, pressure, or a strange feeling in the back, neck, jaw, or upper belly or in one or both shoulders or arms. ? Lightheadedness or sudden weakness.   ? A fast or irregular heartbeat.     · You have symptoms of a stroke. These may include:  ? Sudden numbness, tingling, weakness, or loss of movement in your face, arm, or leg, especially on only one side of your body. ? Sudden vision changes. ? Sudden trouble speaking. ? Sudden confusion or trouble understanding simple statements. ? Sudden problems with walking or balance. ? A sudden, severe headache that is different from past headaches.     · You have severe back or belly pain. Do not wait until your blood pressure comes down on its own. Get help right away. Call your doctor now or seek immediate care if:    · Your blood pressure is much higher than normal (such as 180/120 or higher), but you don't have symptoms.     · You think high blood pressure is causing symptoms, such as:  ? Severe headache.  ? Blurry vision. Watch closely for changes in your health, and be sure to contact your doctor if:    · Your blood pressure measures higher than your doctor recommends at least 2 times. That means the top number is higher or the bottom number is higher, or both.     · You think you may be having side effects from your blood pressure medicine. Where can you learn more? Go to http://www.gray.com/  Enter [id number] in the search box to learn more about \"High Blood Pressure: Care Instructions. \"  Current as of: April 29, 2021               Content Version: 13.0  © 2006-2021 [employer]. Care instructions adapted under license by [employer] (which disclaims liability or warranty for this information). If you have questions about a medical condition or this instruction, always ask your healthcare professional. [place] [address] any warranty or liability for your use of this information.

## 2021-12-22 NOTE — PROGRESS NOTES
Lab results discussed with patient. Vitamin D 21.6. Start Vitamin D 3 1000 units daily. Continue current regimen. Follow up yearly.

## 2022-02-24 ENCOUNTER — OFFICE VISIT (OUTPATIENT)
Dept: ORTHOPEDIC SURGERY | Age: 62
End: 2022-02-24
Payer: COMMERCIAL

## 2022-02-24 VITALS — WEIGHT: 237 LBS | BODY MASS INDEX: 33.93 KG/M2 | HEIGHT: 70 IN

## 2022-02-24 DIAGNOSIS — M25.551 RIGHT HIP PAIN: Primary | ICD-10-CM

## 2022-02-24 DIAGNOSIS — M54.16 LUMBAR RADICULOPATHY, RIGHT: ICD-10-CM

## 2022-02-24 PROCEDURE — 99204 OFFICE O/P NEW MOD 45 MIN: CPT | Performed by: ORTHOPAEDIC SURGERY

## 2022-02-24 NOTE — PROGRESS NOTES
Yue Sexton (: 1960) is a 63 y.o. male patient, here for evaluation of the following chief complaint(s):  Hip Pain (right hip pain)       ASSESSMENT/PLAN:  Below is the assessment and plan developed based on review of pertinent history, physical exam, labs, studies, and medications. 65-year-old male comes in today for evaluation of right-sided low back buttock pain that is noted to radiate down his thigh. Unable to replicate symptoms on exam today. X-rays show degenerative changes throughout lumbar spine. Discussed findings with patient. Most likely consistent with lumbar radiculopathy. Discussed various treatment options with patient. Patient reports symptoms are intermittent, he would like to start with conservative treatment of physical therapy. Plans to take over-the-counter NSAIDs or Tylenol as needed if symptoms worsen. Plans to follow-up as needed. 1. Right hip pain  -     XR PELV 1 OR 2 V; Future  -     XR SPINE LUMB 2 OR 3 V; Future  2. Lumbar radiculopathy, right  -     REFERRAL TO PHYSICAL THERAPY      Encounter Diagnoses   Name Primary?  Right hip pain Yes    Lumbar radiculopathy, right         No follow-ups on file. SUBJECTIVE/OBJECTIVE:  Yue Sexton (: 1960) is a 63 y.o. male who presents today for the following:  Chief Complaint   Patient presents with    Hip Pain     right hip pain       65-year-old male comes in today for evaluation of right-sided buttock low back pain that is noted to radiate down his leg. Describes it to be burning in nature. Intermittent. Comes and goes. Made worse with prolonged walking. Better with rest.  States it has been going on intermittently for the last 2 years. Does not need to take anything for discomfort. Typically rest will improve symptoms.     IMAGING:  XR Results (most recent):  Results from Appointment encounter on 22    XR SPINE LUMB 2 OR 3 V    Narrative  AP and lateral lumbar spine x-rays ordered and personally reviewed. Patient noted with joint space narrowing and osteophyte formation throughout lumbar spine. Worse between L1 and L2 and L3 and L4. Allergies   Allergen Reactions    Erythromycin Nausea and Vomiting       Current Outpatient Medications   Medication Sig    amLODIPine (NORVASC) 10 mg tablet Take 1 Tablet by mouth daily.  losartan-hydroCHLOROthiazide (HYZAAR) 100-25 mg per tablet Take 1 Tablet by mouth daily.  OTHER Indications: Multivitamin with Testosterone Supplement - 1 tablet daily (Patient not taking: No sig reported)    multivitamin (ONE A DAY) tablet Take 1 Tab by mouth daily. (Patient not taking: Reported on 12/21/2021)    omega-3 fatty acids-vitamin e (FISH OIL) 1,000 mg Cap Take  by mouth two (2) times a day. (Patient not taking: Reported on 12/21/2021)     No current facility-administered medications for this visit. Past Medical History:   Diagnosis Date    Hypercholesterolemia     Hypertension, benign         Past Surgical History:   Procedure Laterality Date    COLONOSCOPY N/A 9/24/2019    COL[facility] performed [facility] Gab[facility]ella Cortes [facility] at [facility] ENDOSCOPY    HX TONSILLECTOMY      HX WISDOM TEETH EXTRACTION         Family History   Problem Relation Age of Onset    Heart Attack Mother     Hypertension Mother     Cancer Father         lymphoma        Social History     Tobacco Use    Smoking status: Never Smoker    Smokeless tobacco: Never Used   Substance Use Topics    Alcohol use: Not Currently        All systems reviewed x 12 and were negative with the exception of None      No flowsheet data found. Vitals:  Ht 5' 10\" (1.778 m)   Wt 237 lb (107.5 kg)   BMI 34.01 kg/m²    Body mass index is 34.01 kg/m². Physical Exam    General: No distress    Cardiac: Extremities well perfused    Respiratory: Nonlabored breathing    Back:No tenderness along paravertebral musculature. No pain with flexion or extension. No pain with lateral bending.      LLE: Negative straight leg raise. Motor strength grossly intact L2-S1 distributions. Sensation is intact to light touch grossly in L2-S1 distributions. No long tract signs      RLE: Negative straight leg raise. Motor strength grossly intact L2-S1 distributions. Sensation is intact to light touch grossly in L2-S1 distributions. No long tract signs     Skin: Warm and well-perfused. Normal cap refill. Vascular: Palpable pedal pulses bilaterally. Frank Hilton M.D. was available for immediate consultation as the supervising physician. An electronic signature was used to authenticate this note.   -- Aleksandra Lopez PA-C

## 2022-03-19 PROBLEM — E66.01 SEVERE OBESITY (HCC): Status: ACTIVE | Noted: 2019-06-11

## 2022-07-20 ENCOUNTER — OFFICE VISIT (OUTPATIENT)
Dept: URGENT CARE | Age: 62
End: 2022-07-20
Payer: COMMERCIAL

## 2022-07-20 VITALS
RESPIRATION RATE: 16 BRPM | SYSTOLIC BLOOD PRESSURE: 176 MMHG | OXYGEN SATURATION: 96 % | DIASTOLIC BLOOD PRESSURE: 81 MMHG | WEIGHT: 237 LBS | BODY MASS INDEX: 34.01 KG/M2 | HEART RATE: 71 BPM | TEMPERATURE: 98.7 F

## 2022-07-20 DIAGNOSIS — J01.90 ACUTE SINUSITIS, RECURRENCE NOT SPECIFIED, UNSPECIFIED LOCATION: ICD-10-CM

## 2022-07-20 DIAGNOSIS — J06.9 VIRAL URI WITH COUGH: Primary | ICD-10-CM

## 2022-07-20 PROCEDURE — 99203 OFFICE O/P NEW LOW 30 MIN: CPT | Performed by: NURSE PRACTITIONER

## 2022-07-20 RX ORDER — BENZONATATE 200 MG/1
200 CAPSULE ORAL
Qty: 21 CAPSULE | Refills: 0 | Status: SHIPPED | OUTPATIENT
Start: 2022-07-20 | End: 2022-07-27

## 2022-07-20 RX ORDER — GUAIFENESIN 600 MG/1
600 TABLET, EXTENDED RELEASE ORAL 2 TIMES DAILY
Qty: 20 TABLET | Refills: 0 | Status: SHIPPED | OUTPATIENT
Start: 2022-07-20 | End: 2022-07-30

## 2022-07-20 RX ORDER — AMOXICILLIN AND CLAVULANATE POTASSIUM 875; 125 MG/1; MG/1
1 TABLET, FILM COATED ORAL 2 TIMES DAILY
Qty: 14 TABLET | Refills: 0 | Status: SHIPPED | OUTPATIENT
Start: 2022-07-20 | End: 2022-07-27

## 2022-07-20 NOTE — PROGRESS NOTES
Subjective: (As above and below)     The patient/guardian gave verbal consent to treat. Chief Complaint   Patient presents with    Cold Symptoms     Pt runny nose and cough started Friday and progressively getting worse     Tita Mcdaniel is a 63 y.o. male who presents for evaluation of : cough, nasal congestion, sinus pressure. Symptom onset 4 days ago . Preceding illness: none. No other identified aggravating or alleviating factors. Symptoms are constant and overall worsening now blowing out thick mucus. Hx of pneumonia in past has been monitoring Sp02 that has been normal. Promotes no decrease in PO intake of fluids. Denies: severe lethargy, SOB, vomiting/diarrhea, chest pain, chest pain with breathing, severe headache, fevers . Known Exposure to COVID-19: no. Had negative covid test at home      ROS  Review of Systems - negative except as listed above    Reviewed PmHx, RxHx, FmHx, SocHx, AllgHx and updated in chart. Family History   Problem Relation Age of Onset    Heart Attack Mother     Hypertension Mother     Cancer Father         lymphoma       Past Medical History:   Diagnosis Date    Hypercholesterolemia     Hypertension, benign       Social History     Socioeconomic History    Marital status:    Tobacco Use    Smoking status: Never    Smokeless tobacco: Never   Substance and Sexual Activity    Alcohol use: Not Currently    Drug use: Not Currently    Sexual activity: Yes     Partners: Female          Current Outpatient Medications   Medication Sig    benzonatate (TESSALON) 200 mg capsule Take 1 Capsule by mouth three (3) times daily as needed for Cough for up to 7 days. guaiFENesin ER (MUCINEX) 600 mg ER tablet Take 1 Tablet by mouth two (2) times a day for 10 days. amoxicillin-clavulanate (Augmentin) 875-125 mg per tablet Take 1 Tablet by mouth two (2) times a day for 7 days. amLODIPine (NORVASC) 10 mg tablet Take 1 Tablet by mouth daily.     losartan-hydroCHLOROthiazide (HYZAAR) 100-25 mg per tablet Take 1 Tablet by mouth daily. OTHER Indications: Multivitamin with Testosterone Supplement - 1 tablet daily (Patient not taking: No sig reported)    multivitamin (ONE A DAY) tablet Take 1 Tab by mouth daily. (Patient not taking: Reported on 12/21/2021)    omega-3 fatty acids-vitamin e (FISH OIL) 1,000 mg Cap Take  by mouth two (2) times a day. (Patient not taking: Reported on 12/21/2021)     No current facility-administered medications for this visit. Objective:     Vitals:    07/20/22 1739   BP: (!) 176/81   Pulse: 71   Resp: 16   Temp: 98.7 °F (37.1 °C)   SpO2: 96%   Weight: 237 lb (107.5 kg)       Physical Exam  General appearance - appears well hydrated and does not appear toxic, no acute distress  Eyes - EOMs intact. Non injected. No scleral icterus   Ears - no external swelling. TMs normal bilat. Nose - nasal congestion. No purulent drainage  Mouth - OP clear without swelling, exudate or lesion. Mucus membranes moist. Uvula midline. Neck/Lymphatics - trachea midline, full AROM, no LAD of neck  Chest - Normal breathing effort no wheeze rales, rhonchi or diminishments bilaterally. Heart - RRR, no murmurs  Skin - no observable rashes or pallor  Neurologic- alert and oriented x 3  Psychiatric- normal mood, behavior and though content. Assessment/ Plan:     1. Viral URI with cough    - benzonatate (TESSALON) 200 mg capsule; Take 1 Capsule by mouth three (3) times daily as needed for Cough for up to 7 days. Dispense: 21 Capsule; Refill: 0  - guaiFENesin ER (MUCINEX) 600 mg ER tablet; Take 1 Tablet by mouth two (2) times a day for 10 days. Dispense: 20 Tablet; Refill: 0    2. Acute sinusitis, recurrence not specified, unspecified location    - amoxicillin-clavulanate (Augmentin) 875-125 mg per tablet; Take 1 Tablet by mouth two (2) times a day for 7 days. Dispense: 14 Tablet; Refill: 0      Viral URI  No evidence suggesting complication of illness at this time.  Will discharge home with close monitoring and follow up. Supportive home care for mild symptoms advised- maintain adequate fluid intake, over the counter Tylenol (for fever, aches, pains, chills), deep breathing exercises  Tessalon and guaifenesin for cough  Hold abx as this is likely viral process; if not improved for 3-4 days then may start abx        Follow up: Follow up immediately for any new, worsening or changes or if symptoms are not improving over the next 5-7 days.          Sofia Funk NP

## (undated) DEVICE — SYR 50ML SLIP TIP NSAF LF STRL --

## (undated) DEVICE — SNARE VASC L240CM LOOP W10MM SHTH DIA2.4MM RND STIFF CLD

## (undated) DEVICE — TRAP SURG QUAD PARABOLA SLOT DSGN SFTY SCRN TRAPEASE